# Patient Record
Sex: MALE | Race: WHITE | HISPANIC OR LATINO
[De-identification: names, ages, dates, MRNs, and addresses within clinical notes are randomized per-mention and may not be internally consistent; named-entity substitution may affect disease eponyms.]

---

## 2018-12-05 ENCOUNTER — APPOINTMENT (OUTPATIENT)
Dept: INTERNAL MEDICINE | Facility: CLINIC | Age: 83
End: 2018-12-05

## 2019-01-18 ENCOUNTER — APPOINTMENT (OUTPATIENT)
Dept: INTERNAL MEDICINE | Facility: CLINIC | Age: 84
End: 2019-01-18

## 2022-06-13 ENCOUNTER — INPATIENT (INPATIENT)
Facility: HOSPITAL | Age: 87
LOS: 3 days | Discharge: HOME | DRG: 308 | End: 2022-06-17
Attending: FAMILY MEDICINE | Admitting: INTERNAL MEDICINE
Payer: MEDICARE

## 2022-06-13 VITALS — WEIGHT: 255.07 LBS | HEIGHT: 70 IN

## 2022-06-13 DIAGNOSIS — R00.1 BRADYCARDIA, UNSPECIFIED: ICD-10-CM

## 2022-06-13 DIAGNOSIS — U07.1 COVID-19: ICD-10-CM

## 2022-06-13 LAB
ADD ON TEST-SPECIMEN IN LAB: SIGNIFICANT CHANGE UP
ALBUMIN SERPL ELPH-MCNC: 3.7 G/DL — SIGNIFICANT CHANGE UP (ref 3.3–5)
ALP SERPL-CCNC: 73 U/L — SIGNIFICANT CHANGE UP (ref 40–120)
ALT FLD-CCNC: 34 U/L — SIGNIFICANT CHANGE UP (ref 12–78)
ANION GAP SERPL CALC-SCNC: 5 MMOL/L — SIGNIFICANT CHANGE UP (ref 5–17)
APPEARANCE UR: CLEAR — SIGNIFICANT CHANGE UP
APTT BLD: 31.1 SEC — SIGNIFICANT CHANGE UP (ref 27.5–35.5)
AST SERPL-CCNC: 26 U/L — SIGNIFICANT CHANGE UP (ref 15–37)
BASOPHILS # BLD AUTO: 0.02 K/UL — SIGNIFICANT CHANGE UP (ref 0–0.2)
BASOPHILS NFR BLD AUTO: 0.5 % — SIGNIFICANT CHANGE UP (ref 0–2)
BILIRUB SERPL-MCNC: 0.7 MG/DL — SIGNIFICANT CHANGE UP (ref 0.2–1.2)
BILIRUB UR-MCNC: NEGATIVE — SIGNIFICANT CHANGE UP
BUN SERPL-MCNC: 17 MG/DL — SIGNIFICANT CHANGE UP (ref 7–23)
CALCIUM SERPL-MCNC: 8.8 MG/DL — SIGNIFICANT CHANGE UP (ref 8.5–10.1)
CHLORIDE SERPL-SCNC: 108 MMOL/L — SIGNIFICANT CHANGE UP (ref 96–108)
CO2 SERPL-SCNC: 27 MMOL/L — SIGNIFICANT CHANGE UP (ref 22–31)
COLOR SPEC: YELLOW — SIGNIFICANT CHANGE UP
CREAT SERPL-MCNC: 1.18 MG/DL — SIGNIFICANT CHANGE UP (ref 0.5–1.3)
DIFF PNL FLD: NEGATIVE — SIGNIFICANT CHANGE UP
EGFR: 60 ML/MIN/1.73M2 — SIGNIFICANT CHANGE UP
EOSINOPHIL # BLD AUTO: 0.12 K/UL — SIGNIFICANT CHANGE UP (ref 0–0.5)
EOSINOPHIL NFR BLD AUTO: 2.7 % — SIGNIFICANT CHANGE UP (ref 0–6)
GLUCOSE SERPL-MCNC: 127 MG/DL — HIGH (ref 70–99)
GLUCOSE UR QL: NEGATIVE — SIGNIFICANT CHANGE UP
HCT VFR BLD CALC: 40.7 % — SIGNIFICANT CHANGE UP (ref 39–50)
HGB BLD-MCNC: 14.2 G/DL — SIGNIFICANT CHANGE UP (ref 13–17)
IMM GRANULOCYTES NFR BLD AUTO: 0.5 % — SIGNIFICANT CHANGE UP (ref 0–1.5)
INR BLD: 1.06 RATIO — SIGNIFICANT CHANGE UP (ref 0.88–1.16)
KETONES UR-MCNC: NEGATIVE — SIGNIFICANT CHANGE UP
LEUKOCYTE ESTERASE UR-ACNC: NEGATIVE — SIGNIFICANT CHANGE UP
LYMPHOCYTES # BLD AUTO: 1.11 K/UL — SIGNIFICANT CHANGE UP (ref 1–3.3)
LYMPHOCYTES # BLD AUTO: 25.2 % — SIGNIFICANT CHANGE UP (ref 13–44)
MCHC RBC-ENTMCNC: 32.6 PG — SIGNIFICANT CHANGE UP (ref 27–34)
MCHC RBC-ENTMCNC: 34.9 GM/DL — SIGNIFICANT CHANGE UP (ref 32–36)
MCV RBC AUTO: 93.3 FL — SIGNIFICANT CHANGE UP (ref 80–100)
MONOCYTES # BLD AUTO: 0.57 K/UL — SIGNIFICANT CHANGE UP (ref 0–0.9)
MONOCYTES NFR BLD AUTO: 12.9 % — SIGNIFICANT CHANGE UP (ref 2–14)
NEUTROPHILS # BLD AUTO: 2.57 K/UL — SIGNIFICANT CHANGE UP (ref 1.8–7.4)
NEUTROPHILS NFR BLD AUTO: 58.2 % — SIGNIFICANT CHANGE UP (ref 43–77)
NITRITE UR-MCNC: NEGATIVE — SIGNIFICANT CHANGE UP
PH UR: 8 — SIGNIFICANT CHANGE UP (ref 5–8)
PLATELET # BLD AUTO: 178 K/UL — SIGNIFICANT CHANGE UP (ref 150–400)
POTASSIUM SERPL-MCNC: 4 MMOL/L — SIGNIFICANT CHANGE UP (ref 3.5–5.3)
POTASSIUM SERPL-SCNC: 4 MMOL/L — SIGNIFICANT CHANGE UP (ref 3.5–5.3)
PROT SERPL-MCNC: 7.4 GM/DL — SIGNIFICANT CHANGE UP (ref 6–8.3)
PROT UR-MCNC: NEGATIVE — SIGNIFICANT CHANGE UP
PROTHROM AB SERPL-ACNC: 12.3 SEC — SIGNIFICANT CHANGE UP (ref 10.5–13.4)
RBC # BLD: 4.36 M/UL — SIGNIFICANT CHANGE UP (ref 4.2–5.8)
RBC # FLD: 13.9 % — SIGNIFICANT CHANGE UP (ref 10.3–14.5)
SODIUM SERPL-SCNC: 140 MMOL/L — SIGNIFICANT CHANGE UP (ref 135–145)
SP GR SPEC: 1.01 — SIGNIFICANT CHANGE UP (ref 1.01–1.02)
TROPONIN I, HIGH SENSITIVITY RESULT: 10.85 NG/L — SIGNIFICANT CHANGE UP
UROBILINOGEN FLD QL: NEGATIVE — SIGNIFICANT CHANGE UP
WBC # BLD: 4.41 K/UL — SIGNIFICANT CHANGE UP (ref 3.8–10.5)
WBC # FLD AUTO: 4.41 K/UL — SIGNIFICANT CHANGE UP (ref 3.8–10.5)

## 2022-06-13 PROCEDURE — 97530 THERAPEUTIC ACTIVITIES: CPT | Mod: GP

## 2022-06-13 PROCEDURE — 99223 1ST HOSP IP/OBS HIGH 75: CPT

## 2022-06-13 PROCEDURE — 93010 ELECTROCARDIOGRAM REPORT: CPT

## 2022-06-13 PROCEDURE — 36415 COLL VENOUS BLD VENIPUNCTURE: CPT

## 2022-06-13 PROCEDURE — 71045 X-RAY EXAM CHEST 1 VIEW: CPT | Mod: 26

## 2022-06-13 PROCEDURE — 93306 TTE W/DOPPLER COMPLETE: CPT

## 2022-06-13 PROCEDURE — 86618 LYME DISEASE ANTIBODY: CPT

## 2022-06-13 PROCEDURE — 85379 FIBRIN DEGRADATION QUANT: CPT

## 2022-06-13 PROCEDURE — 97163 PT EVAL HIGH COMPLEX 45 MIN: CPT | Mod: GP

## 2022-06-13 PROCEDURE — 99285 EMERGENCY DEPT VISIT HI MDM: CPT | Mod: CS

## 2022-06-13 PROCEDURE — 84484 ASSAY OF TROPONIN QUANT: CPT

## 2022-06-13 PROCEDURE — 86140 C-REACTIVE PROTEIN: CPT

## 2022-06-13 PROCEDURE — 82728 ASSAY OF FERRITIN: CPT

## 2022-06-13 PROCEDURE — 85027 COMPLETE CBC AUTOMATED: CPT

## 2022-06-13 PROCEDURE — 97116 GAIT TRAINING THERAPY: CPT | Mod: GP

## 2022-06-13 PROCEDURE — 80048 BASIC METABOLIC PNL TOTAL CA: CPT

## 2022-06-13 PROCEDURE — 83036 HEMOGLOBIN GLYCOSYLATED A1C: CPT

## 2022-06-13 RX ORDER — ICOSAPENT ETHYL 500 MG/1
2 CAPSULE, LIQUID FILLED ORAL
Qty: 0 | Refills: 0 | DISCHARGE

## 2022-06-13 RX ORDER — HYDRALAZINE HCL 50 MG
10 TABLET ORAL EVERY 6 HOURS
Refills: 0 | Status: DISCONTINUED | OUTPATIENT
Start: 2022-06-13 | End: 2022-06-17

## 2022-06-13 RX ORDER — CHOLECALCIFEROL (VITAMIN D3) 125 MCG
2 CAPSULE ORAL
Qty: 0 | Refills: 0 | DISCHARGE

## 2022-06-13 RX ORDER — ROSUVASTATIN CALCIUM 5 MG/1
1 TABLET ORAL
Qty: 0 | Refills: 0 | DISCHARGE

## 2022-06-13 RX ORDER — LANOLIN ALCOHOL/MO/W.PET/CERES
3 CREAM (GRAM) TOPICAL AT BEDTIME
Refills: 0 | Status: DISCONTINUED | OUTPATIENT
Start: 2022-06-13 | End: 2022-06-17

## 2022-06-13 RX ORDER — SODIUM CHLORIDE 9 MG/ML
1000 INJECTION INTRAMUSCULAR; INTRAVENOUS; SUBCUTANEOUS ONCE
Refills: 0 | Status: COMPLETED | OUTPATIENT
Start: 2022-06-13 | End: 2022-06-13

## 2022-06-13 RX ORDER — PANTOPRAZOLE SODIUM 20 MG/1
1 TABLET, DELAYED RELEASE ORAL
Qty: 0 | Refills: 0 | DISCHARGE

## 2022-06-13 RX ORDER — FEBUXOSTAT 40 MG/1
1 TABLET ORAL
Qty: 0 | Refills: 0 | DISCHARGE

## 2022-06-13 RX ORDER — UBIDECARENONE 100 MG
2 CAPSULE ORAL
Qty: 0 | Refills: 0 | DISCHARGE

## 2022-06-13 RX ORDER — L.ACIDOPH/B.ANIMALIS/B.LONGUM 15B CELL
1 CAPSULE ORAL
Qty: 0 | Refills: 0 | DISCHARGE

## 2022-06-13 RX ORDER — ACETAMINOPHEN 500 MG
650 TABLET ORAL EVERY 6 HOURS
Refills: 0 | Status: DISCONTINUED | OUTPATIENT
Start: 2022-06-13 | End: 2022-06-17

## 2022-06-13 RX ORDER — AMLODIPINE BESYLATE 2.5 MG/1
5 TABLET ORAL DAILY
Refills: 0 | Status: DISCONTINUED | OUTPATIENT
Start: 2022-06-13 | End: 2022-06-14

## 2022-06-13 RX ORDER — ONDANSETRON 8 MG/1
4 TABLET, FILM COATED ORAL EVERY 8 HOURS
Refills: 0 | Status: DISCONTINUED | OUTPATIENT
Start: 2022-06-13 | End: 2022-06-17

## 2022-06-13 RX ORDER — ATORVASTATIN CALCIUM 80 MG/1
20 TABLET, FILM COATED ORAL AT BEDTIME
Refills: 0 | Status: DISCONTINUED | OUTPATIENT
Start: 2022-06-13 | End: 2022-06-17

## 2022-06-13 RX ORDER — ENOXAPARIN SODIUM 100 MG/ML
40 INJECTION SUBCUTANEOUS EVERY 24 HOURS
Refills: 0 | Status: DISCONTINUED | OUTPATIENT
Start: 2022-06-13 | End: 2022-06-17

## 2022-06-13 RX ORDER — PREGABALIN 225 MG/1
1 CAPSULE ORAL
Qty: 0 | Refills: 0 | DISCHARGE

## 2022-06-13 RX ADMIN — ATORVASTATIN CALCIUM 20 MILLIGRAM(S): 80 TABLET, FILM COATED ORAL at 22:27

## 2022-06-13 RX ADMIN — AMLODIPINE BESYLATE 5 MILLIGRAM(S): 2.5 TABLET ORAL at 17:15

## 2022-06-13 RX ADMIN — Medication 3 MILLIGRAM(S): at 22:36

## 2022-06-13 RX ADMIN — Medication 10 MILLIGRAM(S): at 23:25

## 2022-06-13 RX ADMIN — Medication 100 MILLIGRAM(S): at 23:07

## 2022-06-13 RX ADMIN — ENOXAPARIN SODIUM 40 MILLIGRAM(S): 100 INJECTION SUBCUTANEOUS at 22:36

## 2022-06-13 RX ADMIN — SODIUM CHLORIDE 1000 MILLILITER(S): 9 INJECTION INTRAMUSCULAR; INTRAVENOUS; SUBCUTANEOUS at 13:26

## 2022-06-13 RX ADMIN — Medication 10 MILLIGRAM(S): at 18:36

## 2022-06-13 NOTE — CONSULT NOTE ADULT - ASSESSMENT
ASSESSMENT & PLAN: 87 year old male with history of HTN, normal stress and echo approximately two years ago per patient report,  found to have bradycardia in setting of COVID infection, on high dose beta blocker.    Bradycardia on beta blocker, appropriate heart rate increase with movement  Patient's last dose of Coreg was this morning  Hold any further AV stella blockers  Will need to monitor patient while Coreg washes out  If patient has persistent, symptomatic bradycardia or high degree AV block off of AV  stella blockers pacemaker may be indicated  Will check echo  Patient will likely need alternative agent for blood pressure management  Further management per medicine  Plan discussed with patient, hospitalist and Dr. Vicente

## 2022-06-13 NOTE — PHARMACOTHERAPY INTERVENTION NOTE - COMMENTS
Medication history complete, reviewed medication with patients wife at [phone] and confirmed with DrFirst.  Verified Rosuvastatin and Torsemide with patient.

## 2022-06-13 NOTE — PATIENT PROFILE ADULT - FALL HARM RISK - HARM RISK INTERVENTIONS
Assistance with ambulation/Assistance OOB with selected safe patient handling equipment/Communicate Risk of Fall with Harm to all staff/Monitor gait and stability/Reinforce activity limits and safety measures with patient and family/Sit up slowly, dangle for a short time, stand at bedside before walking/Tailored Fall Risk Interventions/Visual Cue: Yellow wristband and red socks/Bed in lowest position, wheels locked, appropriate side rails in place/Call bell, personal items and telephone in reach/Instruct patient to call for assistance before getting out of bed or chair/Non-slip footwear when patient is out of bed/[place]eller to call system/Physically safe environment - no spills, clutter or unnecessary equipment/Purposeful Proactive Rounding/Room/bathroom lighting operational, light cord in reach

## 2022-06-13 NOTE — H&P ADULT - NSHPLABSRESULTS_GEN_ALL_CORE
14.2   4.41  )-----------( 178      ( 13 Jun 2022 13:11 )             40.7   06-13    140  |  108  |  17  ----------------------------<  127<H>  4.0   |  27  |  1.18    Ca    8.8      13 Jun 2022 13:11    TPro  7.4  /  Alb  3.7  /  TBili  0.7  /  DBili  x   /  AST  26  /  ALT  34  /  AlkPhos  73  06-13      12 Lead ECG (06.13.22 @ 12:44) >    Diagnosis Line Normal sinus rhythm  Right bundle branch block  Left anterior fascicular block  *** Bifascicular block ***  Minimal voltage criteria for LVH, may be normal variant  Abnormal ECG  No previous ECGs available

## 2022-06-13 NOTE — H&P ADULT - ASSESSMENT
* Dizziness weakness, sheyla in the 30s intermittent heart block  EP eval  dc Coreg  start Norvasc    * Uncontrolled HTN  start Norvasc  PRN Hydralazine    * COVID  symptoms started 1 week ago  not hypoxic  no Rx

## 2022-06-13 NOTE — ED ADULT NURSE REASSESSMENT NOTE - NS ED NURSE REASSESS COMMENT FT1
Pt resting comfortably in bed with no acute distress noted. Pt updated on their status, the current plan of care, and available results no needs or requests at this time. Call bell within reach. pt. provided portable phone to call wife.

## 2022-06-13 NOTE — ED ADULT NURSE NOTE - OBJECTIVE STATEMENT
pt. presents to ED complaining of weakness and SOB. SPO2 96%RA on mild exertion. pt. had + covid swab outp. joshua pain, discomfort, fevers.   bradycardic to low 30s on cardiac monitor.

## 2022-06-13 NOTE — H&P ADULT - HISTORY OF PRESENT ILLNESS
86 y/o male with a PMHx of HTN presents to the ED c/o cough and fatigue x1 week. Pt was seen at urgent care today, tested COVID + and came to ED for evaluation. Here his HR drops in the 30's intermittent block per ED physician, he is on Coreg at home. Adm for further evaluation of arrhythmia.

## 2022-06-13 NOTE — ED ADULT NURSE NOTE - CHIEF COMPLAINT QUOTE
c/o weakness, fatigue and inability to walk [id number]. pt was seen at  this morning +COVID. O2 93-95%, HR 60 at triage

## 2022-06-13 NOTE — ED ADULT TRIAGE NOTE - CHIEF COMPLAINT QUOTE
c/o weakness, fatigue and inability to walk [id number]. pt was seen at  this morning +COVID c/o weakness, fatigue and inability to walk [id number]. pt was seen at  this morning +COVID. O2 93-95%, HR 60 at triage

## 2022-06-13 NOTE — ED PROVIDER NOTE - CONSTITUTIONAL, MLM
normal... Comfortable appearing, awake, alert, oriented to person, place, time/situation and in no apparent distress.

## 2022-06-13 NOTE — ED PROVIDER NOTE - OBJECTIVE STATEMENT
88 y/o male with a PMHx of HTN presents to the ED c/o cough and fatigue x1 week. Pt was seen at urgent care today, tested COVID + and came to ED for evaluation.

## 2022-06-13 NOTE — H&P ADULT - NSHPPHYSICALEXAM_GEN_ALL_CORE
Vital Signs Last 24 Hrs  T(C): 36.9 (13 Jun 2022 12:46), Max: 36.9 (13 Jun 2022 12:46)  T(F): 98.5 (13 Jun 2022 12:46), Max: 98.5 (13 Jun 2022 12:46)  HR: 60 (13 Jun 2022 12:46) (60 - 60)  BP: 190/85 (13 Jun 2022 12:46) (190/85 - 190/85)  BP(mean): 115 (13 Jun 2022 12:46) (115 - 115)  RR: 17 (13 Jun 2022 12:46) (17 - 17)  SpO2: 96% (13 Jun 2022 12:46) (96% - 96%)    · CONSTITUTIONAL: Comfortable appearing, awake, alert, oriented to person, place, time/situation and in no apparent distress.  · ENMT: Airway patent, Nasal mucosa clear. Mouth with normal mucosa. Throat has no vesicles, no oropharyngeal exudates and uvula is midline.  · EYES: Clear bilaterally, pupils equal, round and reactive to light.  · CARDIAC: Bradycardic Heart sounds S1, S2.  No murmurs, rubs or gallops.  · RESPIRATORY: Breath sounds clear and equal bilaterally.  · GASTROINTESTINAL: Abdomen soft, non-tender, no guarding.  · MUSCULOSKELETAL: Spine appears normal, range of motion is not limited, no muscle or joint tenderness  · NEUROLOGICAL: Alert and oriented, no focal deficits, no motor or sensory deficits.  · SKIN: Skin normal color for race, warm, dry and intact. No evidence of rash.

## 2022-06-13 NOTE — ED PROVIDER NOTE - CARE PLAN
1 Principal Discharge DX:	Bradycardia  Secondary Diagnosis:	2019 novel coronavirus disease (COVID-19)

## 2022-06-14 LAB
CULTURE RESULTS: SIGNIFICANT CHANGE UP
D DIMER BLD IA.RAPID-MCNC: 195 NG/ML DDU — SIGNIFICANT CHANGE UP
SPECIMEN SOURCE: SIGNIFICANT CHANGE UP

## 2022-06-14 PROCEDURE — 93306 TTE W/DOPPLER COMPLETE: CPT | Mod: 26

## 2022-06-14 PROCEDURE — 99233 SBSQ HOSP IP/OBS HIGH 50: CPT

## 2022-06-14 RX ORDER — AMLODIPINE BESYLATE 2.5 MG/1
10 TABLET ORAL DAILY
Refills: 0 | Status: DISCONTINUED | OUTPATIENT
Start: 2022-06-14 | End: 2022-06-17

## 2022-06-14 RX ORDER — RITONAVIR 100 MG/1
100 TABLET, FILM COATED ORAL
Refills: 0 | Status: DISCONTINUED | OUTPATIENT
Start: 2022-06-14 | End: 2022-06-14

## 2022-06-14 RX ADMIN — AMLODIPINE BESYLATE 10 MILLIGRAM(S): 2.5 TABLET ORAL at 10:48

## 2022-06-14 RX ADMIN — Medication 10 MILLIGRAM(S): at 22:42

## 2022-06-14 RX ADMIN — Medication 3 MILLIGRAM(S): at 22:38

## 2022-06-14 RX ADMIN — ENOXAPARIN SODIUM 40 MILLIGRAM(S): 100 INJECTION SUBCUTANEOUS at 23:32

## 2022-06-14 RX ADMIN — Medication 5 MILLIGRAM(S): at 10:48

## 2022-06-14 RX ADMIN — Medication 100 MILLIGRAM(S): at 12:13

## 2022-06-14 RX ADMIN — ATORVASTATIN CALCIUM 20 MILLIGRAM(S): 80 TABLET, FILM COATED ORAL at 22:38

## 2022-06-14 RX ADMIN — Medication 100 MILLIGRAM(S): at 16:54

## 2022-06-14 RX ADMIN — Medication 100 MILLIGRAM(S): at 22:38

## 2022-06-14 RX ADMIN — Medication 10 MILLIGRAM(S): at 13:35

## 2022-06-14 NOTE — PROGRESS NOTE ADULT - SUBJECTIVE AND OBJECTIVE BOX
CC: dizzy weak (2022 12:37)    HPI: 86 y/o male with a PMHx of HTN presents to the ED c/o cough and fatigue x1 week. Pt was seen at urgent care today, tested COVID + and came to ED for evaluation. Here his HR drops in the 30's intermittent block per ED physician, he is on Coreg at home. Adm for further evaluation of arrhythmia.   (2022 16:06)    INTERVAL HPI/OVERNIGHT EVENTS: + cough, no hypoxia, low HR  Other ROS reviewed and neg     Vital Signs Last 24 Hrs  T(C): 36.1 (2022 08:04), Max: 36.9 (2022 17:15)  T(F): 97 (2022 08:04), Max: 98.5 (2022 17:15)  HR: 39 (2022 14:00) (36 - 69)  BP: 157/46 (2022 14:00) (125/65 - 200/89)  BP(mean): 75 (2022 14:00) (70 - 103)  RR: 22 (2022 14:00) (11 - 23)  SpO2: 95% (2022 14:00) (94% - 99%)  I&O's Detail    2022 07:01  -  2022 07:00  --------------------------------------------------------  IN:  Total IN: 0 mL    OUT:    Voided (mL): 950 mL  Total OUT: 950 mL    Total NET: -950 mL      2022 07:01  -  2022 15:23  --------------------------------------------------------  IN:  Total IN: 0 mL    OUT:    Voided (mL): 150 mL  Total OUT: 150 mL    Total NET: -150 mL                        14.2   4.41  )-----------( 178      ( 2022 13:11 )             40.7     2022 13:11    140    |  108    |  17     ----------------------------<  127    4.0     |  27     |  1.18     Ca    8.8        2022 13:11    TPro  7.4    /  Alb  3.7    /  TBili  0.7    /  DBili  x      /  AST  26     /  ALT  34     /  AlkPhos  73     2022 13:11    PT/INR - ( 2022 13:11 )   PT: 12.3 sec;   INR: 1.06 ratio         PTT - ( 2022 13:11 )  PTT:31.1 sec    LIVER FUNCTIONS - ( 2022 13:11 )  Alb: 3.7 g/dL / Pro: 7.4 gm/dL / ALK PHOS: 73 U/L / ALT: 34 U/L / AST: 26 U/L / GGT: x           Urinalysis Basic - ( 2022 13:11 )    Color: Yellow / Appearance: Clear / S.010 / pH: x  Gluc: x / Ketone: Negative  / Bili: Negative / Urobili: Negative   Blood: x / Protein: Negative / Nitrite: Negative   Leuk Esterase: Negative / RBC: x / WBC x   Sq Epi: x / Non Sq Epi: x / Bacteria: x    MEDICATIONS  (STANDING):  amLODIPine   Tablet 10 milliGRAM(s) Oral daily  atorvastatin 20 milliGRAM(s) Oral at bedtime  benzonatate 100 milliGRAM(s) Oral every 8 hours  enoxaparin Injectable 40 milliGRAM(s) SubCutaneous every 24 hours  torsemide 5 milliGRAM(s) Oral daily    MEDICATIONS  (PRN):  acetaminophen     Tablet .. 650 milliGRAM(s) Oral every 6 hours PRN Temp greater or equal to 38C (100.4F), Mild Pain (1 - 3)  aluminum hydroxide/magnesium hydroxide/simethicone Suspension 30 milliLiter(s) Oral every 4 hours PRN Dyspepsia  guaiFENesin Oral Liquid (Sugar-Free) 100 milliGRAM(s) Oral every 6 hours PRN Cough  hydrALAZINE Injectable 10 milliGRAM(s) IV Push every 6 hours PRN BP>160  melatonin 3 milliGRAM(s) Oral at bedtime PRN Insomnia  ondansetron Injectable 4 milliGRAM(s) IV Push every 8 hours PRN Nausea and/or Vomiting    RADIOLOGY & ADDITIONAL TESTS: personally visualized    PHYSICAL EXAM:    General: obese male in no acute distress  Eyes: PERRLA, EOMI; conjunctiva and sclera clear  Head: Normocephalic; atraumatic  ENMT: No nasal discharge; airway clear  Neck: Supple; non tender; no masses  Respiratory: No wheezes, rales or rhonchi  Cardiovascular: S1, S2 reg, bradycardic  Gastrointestinal: Soft non-tender non-distended; Normal bowel sounds  Genitourinary: No costovertebral angle tenderness  Extremities: No clubbing, cyanosis or edema  Vascular: Peripheral pulses palpable 2+ bilaterally  Neurological: Alert and oriented x4  Skin: Warm and dry.   Musculoskeletal: Normal tone  Psychiatric: Cooperative and appropriate

## 2022-06-14 NOTE — PROGRESS NOTE ADULT - SUBJECTIVE AND OBJECTIVE BOX
HPI:  86 y/o male with a PMHx of HTN presents to the ED c/o cough and fatigue x1 week. Pt was seen at urgent care today, tested COVID + and came to ED for evaluation. In ED patient noted to have episodes bradycardia with HR 30s.  Patient denies any CP, palpitations, SOB, dizziness, lightheadedness, near syncope or syncope.  Patient is currently on Coreg 37.5mg BID and took last dose this morning.  EP asked to evaluate for bradycardia.       (2022 16:06)      Subjective:      TELE: sinus with 2:1 HB, bradycardic, hear rate ranges 36-42.      MEDICATIONS  (STANDING):  amLODIPine   Tablet 10 milliGRAM(s) Oral daily  atorvastatin 20 milliGRAM(s) Oral at bedtime  benzonatate 100 milliGRAM(s) Oral every 8 hours  enoxaparin Injectable 40 milliGRAM(s) SubCutaneous every 24 hours  torsemide 5 milliGRAM(s) Oral daily    MEDICATIONS  (PRN):  acetaminophen     Tablet .. 650 milliGRAM(s) Oral every 6 hours PRN Temp greater or equal to 38C (100.4F), Mild Pain (1 - 3)  aluminum hydroxide/magnesium hydroxide/simethicone Suspension 30 milliLiter(s) Oral every 4 hours PRN Dyspepsia  guaiFENesin Oral Liquid (Sugar-Free) 100 milliGRAM(s) Oral every 6 hours PRN Cough  hydrALAZINE Injectable 10 milliGRAM(s) IV Push every 6 hours PRN BP>160  melatonin 3 milliGRAM(s) Oral at bedtime PRN Insomnia  ondansetron Injectable 4 milliGRAM(s) IV Push every 8 hours PRN Nausea and/or Vomiting      Allergies    No Known Allergies      Vital Signs Last 24 Hrs  T(C): 36.1 (2022 08:04), Max: 36.9 (2022 12:46)  T(F): 97 (2022 08:04), Max: 98.5 (2022 12:46)  HR: 40 (2022 09:00) (36 - 69)  BP: 156/82 (2022 08:00) (125/65 - 200/89)  BP(mean): 99 (2022 08:00) (70 - 115)  RR: 15 (2022 09:00) (12 - 23)  SpO2: 96% (2022 09:00) (94% - 99%)    PHYSICAL EXAMINATION:  GENERAL: In no apparent distress, well nourished, and hydrated.  HEAD:  Atraumatic, Normocephalic  EYES: EOMI, PERRLA, conjunctiva and sclera clear  ENMT: No tonsillar erythema, exudates, or enlargement; Moist mucous membranes, Good dentition, No lesions  NECK: Supple and normal thyroid.  No JVD or carotid bruit.  Carotid pulse is 2+ bilaterally.  HEART: bradycardic, Regular rhythm; No murmurs, rubs, or gallops.  PULMONARY: Clear to auscultation and perfusion.  No rales, wheezing, or rhonchi bilaterally.  ABDOMEN: Soft, Nontender, Nondistended; Bowel sounds present  EXTREMITIES:  2+ Peripheral Pulses, No clubbing, cyanosis, or edema  LYMPH: No lymphadenopathy noted  NEUROLOGICAL: Grossly nonfocal    LABS:                        14.2   4.41  )-----------( 178      ( 2022 13:11 )             40.7     06-13    140  |  108  |  17  ----------------------------<  127<H>  4.0   |  27  |  1.18    Ca    8.8      2022 13:11    TPro  7.4  /  Alb  3.7  /  TBili  0.7  /  DBili  x   /  AST  26  /  ALT  34  /  AlkPhos  73  06-13    PT/INR - ( 2022 13:11 )   PT: 12.3 sec;   INR: 1.06 ratio         PTT - ( 2022 13:11 )  PTT:31.1 sec  Urinalysis Basic - ( 2022 13:11 )    Color: Yellow / Appearance: Clear / S.010 / pH: x  Gluc: x / Ketone: Negative  / Bili: Negative / Urobili: Negative   Blood: x / Protein: Negative / Nitrite: Negative   Leuk Esterase: Negative / RBC: x / WBC x   Sq Epi: x / Non Sq Epi: x / Bacteria: x            RADIOLOGY & ADDITIONAL TESTS:    ECHO 22 - report pending

## 2022-06-14 NOTE — PROGRESS NOTE ADULT - ASSESSMENT
1. 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 CoVID infection - stable, >5 days  - symtpoamtic management    3. VTE proph - LMWH    4. HTN - amlodipine, hydralazine    5. HLD - statin    Time spent 63 min  Discussed with RN, EP team

## 2022-06-14 NOTE — PROGRESS NOTE ADULT - ASSESSMENT
87 year old male with history of HTN, normal stress and echo approximately two years ago per patient report,  found to have bradycardia in setting of COVID infection, on high dose beta blocker.    Bradycardia, 2:1 Heart block  hemodynamically stable, asymptomatic   continue tele  await coreg washout for another 24hrs  Hold any further AV stella blockers  If patient has persistent, symptomatic bradycardia or high degree AV block off of AV  stella blockers pacemaker may be indicated  EP will follow

## 2022-06-15 LAB
A1C WITH ESTIMATED AVERAGE GLUCOSE RESULT: 5.6 % — SIGNIFICANT CHANGE UP (ref 4–5.6)
ANION GAP SERPL CALC-SCNC: 7 MMOL/L — SIGNIFICANT CHANGE UP (ref 5–17)
BUN SERPL-MCNC: 15 MG/DL — SIGNIFICANT CHANGE UP (ref 7–23)
CALCIUM SERPL-MCNC: 8.9 MG/DL — SIGNIFICANT CHANGE UP (ref 8.5–10.1)
CHLORIDE SERPL-SCNC: 110 MMOL/L — HIGH (ref 96–108)
CO2 SERPL-SCNC: 24 MMOL/L — SIGNIFICANT CHANGE UP (ref 22–31)
CREAT SERPL-MCNC: 1.02 MG/DL — SIGNIFICANT CHANGE UP (ref 0.5–1.3)
EGFR: 72 ML/MIN/1.73M2 — SIGNIFICANT CHANGE UP
ESTIMATED AVERAGE GLUCOSE: 114 MG/DL — SIGNIFICANT CHANGE UP (ref 68–114)
GLUCOSE SERPL-MCNC: 118 MG/DL — HIGH (ref 70–99)
HCT VFR BLD CALC: 41.3 % — SIGNIFICANT CHANGE UP (ref 39–50)
HGB BLD-MCNC: 14.7 G/DL — SIGNIFICANT CHANGE UP (ref 13–17)
MCHC RBC-ENTMCNC: 32.7 PG — SIGNIFICANT CHANGE UP (ref 27–34)
MCHC RBC-ENTMCNC: 35.6 GM/DL — SIGNIFICANT CHANGE UP (ref 32–36)
MCV RBC AUTO: 91.8 FL — SIGNIFICANT CHANGE UP (ref 80–100)
PLATELET # BLD AUTO: 204 K/UL — SIGNIFICANT CHANGE UP (ref 150–400)
POTASSIUM SERPL-MCNC: 3.6 MMOL/L — SIGNIFICANT CHANGE UP (ref 3.5–5.3)
POTASSIUM SERPL-SCNC: 3.6 MMOL/L — SIGNIFICANT CHANGE UP (ref 3.5–5.3)
RBC # BLD: 4.5 M/UL — SIGNIFICANT CHANGE UP (ref 4.2–5.8)
RBC # FLD: 14.1 % — SIGNIFICANT CHANGE UP (ref 10.3–14.5)
SODIUM SERPL-SCNC: 141 MMOL/L — SIGNIFICANT CHANGE UP (ref 135–145)
WBC # BLD: 7.73 K/UL — SIGNIFICANT CHANGE UP (ref 3.8–10.5)
WBC # FLD AUTO: 7.73 K/UL — SIGNIFICANT CHANGE UP (ref 3.8–10.5)

## 2022-06-15 PROCEDURE — 99232 SBSQ HOSP IP/OBS MODERATE 35: CPT

## 2022-06-15 RX ADMIN — Medication 100 MILLIGRAM(S): at 14:41

## 2022-06-15 RX ADMIN — ENOXAPARIN SODIUM 40 MILLIGRAM(S): 100 INJECTION SUBCUTANEOUS at 21:34

## 2022-06-15 RX ADMIN — Medication 100 MILLIGRAM(S): at 21:32

## 2022-06-15 RX ADMIN — Medication 100 MILLIGRAM(S): at 05:06

## 2022-06-15 RX ADMIN — Medication 5 MILLIGRAM(S): at 10:49

## 2022-06-15 RX ADMIN — ATORVASTATIN CALCIUM 20 MILLIGRAM(S): 80 TABLET, FILM COATED ORAL at 21:33

## 2022-06-15 RX ADMIN — Medication 10 MILLIGRAM(S): at 06:26

## 2022-06-15 RX ADMIN — AMLODIPINE BESYLATE 10 MILLIGRAM(S): 2.5 TABLET ORAL at 10:49

## 2022-06-15 NOTE — PROGRESS NOTE ADULT - SUBJECTIVE AND OBJECTIVE BOX
HPI:  88 y/o male with a PMHx of HTN presents to the ED c/o cough and fatigue x1 week. Pt was seen at urgent care today, tested COVID + and came to ED for evaluation. In ED patient noted to have episodes bradycardia with HR 30s.  Patient denies any CP, palpitations, SOB, dizziness, lightheadedness, near syncope or syncope.  Patient is currently on Coreg 37.5mg BID and took last dose this morning.  EP asked to evaluate for bradycardia.    6/15/22:  pt seen sitting up in bed in NAD, VSS, wearing O2 via NC, sat 97%.  Last dose Coreg on Monday AM.  Pt endorses that he may have been feeling dizzy and weak with with LOVELL for over a month, prior to getting covid.  TELE: sinus with intermittent yet more frequent  2:1 HB, bradycardic  36-42 bpm      MEDICATIONS  (STANDING):  amLODIPine   Tablet 10 milliGRAM(s) Oral daily  atorvastatin 20 milliGRAM(s) Oral at bedtime  benzonatate 100 milliGRAM(s) Oral every 8 hours  enoxaparin Injectable 40 milliGRAM(s) SubCutaneous every 24 hours  torsemide 5 milliGRAM(s) Oral daily    MEDICATIONS  (PRN):  acetaminophen     Tablet .. 650 milliGRAM(s) Oral every 6 hours PRN Temp greater or equal to 38C (100.4F), Mild Pain (1 - 3)  aluminum hydroxide/magnesium hydroxide/simethicone Suspension 30 milliLiter(s) Oral every 4 hours PRN Dyspepsia  guaiFENesin Oral Liquid (Sugar-Free) 100 milliGRAM(s) Oral every 6 hours PRN Cough  hydrALAZINE Injectable 10 milliGRAM(s) IV Push every 6 hours PRN BP>160  melatonin 3 milliGRAM(s) Oral at bedtime PRN Insomnia  ondansetron Injectable 4 milliGRAM(s) IV Push every 8 hours PRN Nausea and/or Vomiting    Allergies    No Known Allergies    Intolerances      REVIEW OF SYSTEMS:    CONSTITUTIONAL: As per HPI  EYES/ENT: No visual changes;  No vertigo or throat pain   NECK: No pain or stiffness  RESPIRATORY: +cough, No shortness of breath  CARDIOVASCULAR: No chest pain or palpitations  GASTROINTESTINAL: No abdominal or epigastric pain. No nausea, vomiting, or hematemesis; No diarrhea or constipation. No melena or hematochezia.  GENITOURINARY: No dysuria, frequency or hematuria  NEUROLOGICAL: Occasional lightheadedness,  weakness  SKIN: No itching, burning, rashes, or lesions   All other review of systems is negative unless indicated above      Vital Signs Last 24 Hrs  T(C): 36 (15 Volodymyr 2022 11:40), Max: 36.9 (14 Jun 2022 20:00)  T(F): 96.8 (15 Volodymyr 2022 11:40), Max: 98.4 (14 Jun 2022 20:00)  HR: 44 (15 Volodymyr 2022 11:00) (36 - 71)  BP: 163/58 (15 Volodymyr 2022 11:00) (115/57 - 183/62)  BP(mean): 85 (15 Volodymyr 2022 11:00) (69 - 95)  RR: 17 (15 Volodymyr 2022 11:00) (11 - 23)  SpO2: 100% (15 Volodymyr 2022 11:00) (95% - 100%)                          14.7   7.73  )-----------( 204      ( 15 Volodymyr 2022 05:07 )             41.3     06-15    141  |  110<H>  |  15  ----------------------------<  118<H>  3.6   |  24  |  1.02    Ca    8.9      15 Volod[place] 2022 05:07    TPro  7.4  /  Alb  3.7  /  TBili  0.7  /  DBili  x   /  AST  26  /  ALT  34  /  AlkPhos  73  06-13    TroponinI hsT: <-10.85, <-11.39      PHYSICAL EXAM:    General: WN/WD NAD  Neurology: A&Ox3, nonfocal, GRAF x 4  HEENT: NC/AT, neck supple, no JVD, EOMI, PERRL  Respiratory: CTA B/L  CV: S1S2, no murmurs, rubs or gallops  Abdominal: Soft, NT, ND +BS  Extremities: No edema, + peripheral pulses  Skin: No rashes    < from: TTE Echo Complete w/o Contrast w/ Doppler (06.14.22 @ 11:56) >   Impression     Summary     Left ventricle systolic function appears preserved in the presence of a   cardiac arrhythmia. Left ventricle size and structure are within normal   limitations.   Estimated left ventricular ejection fraction is 60 %.   Normal appearing left atrium.   Normal appearing right atrium.   Normal appearing right ventricle structure and function.   The aortic valve appears mildly calcified. Valve opening seems to be   normal.   Moderate mitral annular calcification is present.   The mitral valve leaflets appear thickened.   EA reversal of the mitral inflow consistent with reducedcompliance of   the   left ventricle.   Trace mitral regurgitation is present.   The tricuspid valve leaflets appear mildly thickened and/or calcified,   but   open well.   Trace to mild tricuspid valve regurgitation is present.   No evidence of pericardial effusion.         HPI:  86 y/o male with a PMHx of HTN presents to the ED c/o cough and fatigue x1 week. Pt was seen at urgent care today, tested COVID + and came to ED for evaluation. In ED patient noted to have episodes bradycardia with HR 30s.  Patient denies any CP, palpitations, SOB, dizziness, lightheadedness, near syncope or syncope.  Patient is currently on Coreg 37.5mg BID and took last dose this morning.  EP asked to evaluate for bradycardia.    6/15/22:  pt seen sitting up in bed in NAD, VSS, wearing O2 via NC, sat 97%.  Last dose Coreg on Monday AM.  Pt endorses that he may have been feeling dizzy and weak with with LOVELL for over a month, prior to getting covid.  TELE: sinus with intermittent yet more frequent  2:1 HB, bradycardic  36-42 bpm  EKG:  < from: 12 Lead ECG (06.13.22 @ 12:44) >  Ventricular Rate 62 BPM  Atrial Rate 62 BPM  P-R Interval 178 ms  QRS Duration 146 ms  Q-T Interval 482 ms  QTC Calculation(Bazett) 489 ms  P Axis 24 degrees  R Axis -62 degrees  T Axis -27 degrees    Diagnosis Line Normal sinus rhythm  Right bundle branch block  Left anterior fascicular block  *** Bifascicular block ***  Minimal voltage criteria for LVH, may be normal variant  Abnormal ECG  No previous ECGs available  Confirmed by MATTHIAS KHAN MD ([id number]) on 6/13/2022 3:31:43 PM             MEDICATIONS  (STANDING):  amLODIPine   Tablet 10 milliGRAM(s) Oral daily  atorvastatin 20 milliGRAM(s) Oral at bedtime  benzonatate 100 milliGRAM(s) Oral every 8 hours  enoxaparin Injectable 40 milliGRAM(s) SubCutaneous every 24 hours  torsemide 5 milliGRAM(s) Oral daily    MEDICATIONS  (PRN):  acetaminophen     Tablet .. 650 milliGRAM(s) Oral every 6 hours PRN Temp greater or equal to 38C (100.4F), Mild Pain (1 - 3)  aluminum hydroxide/magnesium hydroxide/simethicone Suspension 30 milliLiter(s) Oral every 4 hours PRN Dyspepsia  guaiFENesin Oral Liquid (Sugar-Free) 100 milliGRAM(s) Oral every 6 hours PRN Cough  hydrALAZINE Injectable 10 milliGRAM(s) IV Push every 6 hours PRN BP>160  melatonin 3 milliGRAM(s) Oral at bedtime PRN Insomnia  ondansetron Injectable 4 milliGRAM(s) IV Push every 8 hours PRN Nausea and/or Vomiting    Allergies    No Known Allergies    Intolerances      REVIEW OF SYSTEMS:    CONSTITUTIONAL: As per HPI  EYES/ENT: No visual changes;  No vertigo or throat pain   NECK: No pain or stiffness  RESPIRATORY: +cough, No shortness of breath  CARDIOVASCULAR: No chest pain or palpitations  GASTROINTESTINAL: No abdominal or epigastric pain. No nausea, vomiting, or hematemesis; No diarrhea or constipation. No melena or hematochezia.  GENITOURINARY: No dysuria, frequency or hematuria  NEUROLOGICAL: Occasional lightheadedness,  weakness  SKIN: No itching, burning, rashes, or lesions   All other review of systems is negative unless indicated above      Vital Signs Last 24 Hrs  T(C): 36 (15 Volodymyr 2022 11:40), Max: 36.9 (14 Jun 2022 20:00)  T(F): 96.8 (15 Volodymyr 2022 11:40), Max: 98.4 (14 Jun 2022 20:00)  HR: 44 (15 Volodymyr 2022 11:00) (36 - 71)  BP: 163/58 (15 Volodymyr 2022 11:00) (115/57 - 183/62)  BP(mean): 85 (15 Volodymyr 2022 11:00) (69 - 95)  RR: 17 (15 Volodymyr 2022 11:00) (11 - 23)  SpO2: 100% (15 Volodymyr 2022 11:00) (95% - 100%)                          14.7   7.73  )-----------( 204      ( 15 Volodymyr 2022 05:07 )             41.3     06-15    141  |  110<H>  |  15  ----------------------------<  118<H>  3.6   |  24  |  1.02    Ca    8.9      15 Vol[place] 2022 05:07    TPro  7.4  /  Alb  3.7  /  TBili  0.7  /  DBili  x   /  AST  26  /  ALT  34  /  AlkPhos  73  06-13    TroponinI hsT: <-10.85, <-11.39      PHYSICAL EXAM:    General: WN/WD NAD  Neurology: A&Ox3, nonfocal, GRAF x 4  HEENT: NC/AT, neck supple, no JVD, EOMI, PERRL  Respiratory: CTA B/L  CV: S1S2, no murmurs, rubs or gallops  Abdominal: Soft, NT, ND +BS  Extremities: No edema, + peripheral pulses  Skin: No rashes    < from: TTE Echo Complete w/o Contrast w/ Doppler (06.14.22 @ 11:56) >   Impression     Summary     Left ventricle systolic function appears preserved in the presence of a   cardiac arrhythmia. Left ventricle size and structure are within normal   limitations.   Estimated left ventricular ejection fraction is 60 %.   Normal appearing left atrium.   Normal appearing right atrium.   Normal appearing right ventricle structure and function.   The aortic valve appears mildly calcified. Valve opening seems to be   normal.   Moderate mitral annular calcification is present.   The mitral valve leaflets appear thickened.   EA reversal of the mitral inflow consistent with reducedcompliance of   the   left ventricle.   Trace mitral regurgitation is present.   The tricuspid valve leaflets appear mildly thickened and/or calcified,   but   open well.   Trace to mild tricuspid valve regurgitation is present.   No evidence of pericardial effusion.

## 2022-06-15 NOTE — PROGRESS NOTE ADULT - SUBJECTIVE AND OBJECTIVE BOX
CC: dizzy weak (2022 12:37)    HPI: 88 y/o male with a PMHx of HTN presents to the ED c/o cough and fatigue x1 week. Pt was seen at urgent care today, tested COVID + and came to ED for evaluation. Here his HR drops in the 30's intermittent block per ED physician, he is on Coreg at home. Adm for further evaluation of arrhythmia.   (2022 16:06)    INTERVAL HPI/OVERNIGHT EVENTS: + cough, no hypoxia, low HR, forgetful  Other ROS reviewed and neg     T(C): 35.8 (06-15-22 @ 06:06), Max: 36.9 (22 @ 20:00)  HR: 42 (06-15-22 @ 06:00) (36 - 71)  BP: 180/56 (06-15-22 @ 06:00) (115/57 - 183/62)  RR: 14 (06-15-22 @ 06:00) (11 - 23)  SpO2: 98% (06-15-22 @ 06:00) (95% - 98%)    22 @ 07:01  -  06-15-22 @ 07:00  --------------------------------------------------------  IN: 1100 mL / OUT: 2000 mL / NET: -900 mL      Daily     Daily Weight in k.6 (15 Volodymyr 2022 06:06)                       14.7   7.73  )-----------( 204      ( 15 Volodymyr 2022 05:07 )             41.3     15 Volodymyr 2022 05:07    141    |  110    |  15     ----------------------------<  118    3.6     |  24     |  1.02     Ca    8.9        15 Volodymyr 2022 05:07    TPro  7.4    /  Alb  3.7    /  TBili  0.7    /  DBili  x      /  AST  26     /  ALT  34     /  AlkPhos  73     2022 13:11    PT/INR - ( 2022 13:11 )   PT: 12.3 sec;   INR: 1.06 ratio       PTT - ( 2022 13:11 )  PTT:31.1 sec    LIVER FUNCTIONS - ( 2022 13:11 )  Alb: 3.7 g/dL / Pro: 7.4 gm/dL / ALK PHOS: 73 U/L / ALT: 34 U/L / AST: 26 U/L / GGT: x           Urinalysis Basic - ( 2022 13:11 )    Color: Yellow / Appearance: Clear / S.010 / pH: x  Gluc: x / Ketone: Negative  / Bili: Negative / Urobili: Negative   Blood: x / Protein: Negative / Nitrite: Negative   Leuk Esterase: Negative / RBC: x / WBC x   Sq Epi: x / Non Sq Epi: x / Bacteria: x    acetaminophen     Tablet .. 650 milliGRAM(s) Oral every 6 hours PRN  aluminum hydroxide/magnesium hydroxide/simethicone Suspension 30 milliLiter(s) Oral every 4 hours PRN  amLODIPine   Tablet 10 milliGRAM(s) Oral daily  atorvastatin 20 milliGRAM(s) Oral at bedtime  benzonatate 100 milliGRAM(s) Oral every 8 hours  enoxaparin Injectable 40 milliGRAM(s) SubCutaneous every 24 hours  guaiFENesin Oral Liquid (Sugar-Free) 100 milliGRAM(s) Oral every 6 hours PRN  hydrALAZINE Injectable 10 milliGRAM(s) IV Push every 6 hours PRN  melatonin 3 milliGRAM(s) Oral at bedtime PRN  ondansetron Injectable 4 milliGRAM(s) IV Push every 8 hours PRN  torsemide 5 milliGRAM(s) Oral daily    RADIOLOGY & ADDITIONAL TESTS: personally visualized    PHYSICAL EXAM:    General: obese male in no acute distress  Eyes: PERRLA, EOMI; conjunctiva and sclera clear  Head: Normocephalic; atraumatic  ENMT: No nasal discharge; airway clear  Neck: Supple; non tender; no masses  Respiratory: No wheezes, rales or rhonchi  Cardiovascular: S1, S2 reg, bradycardic  Gastrointestinal: Soft non-tender non-distended; Normal bowel sounds  Genitourinary: No costovertebral angle tenderness  Extremities: No clubbing, cyanosis or edema  Vascular: Peripheral pulses palpable 2+ bilaterally  Neurological: Alert and oriented x4  Skin: Warm and dry.   Musculoskeletal: Normal tone  Psychiatric: Cooperative, forgetful

## 2022-06-15 NOTE — PROGRESS NOTE ADULT - ASSESSMENT
87 year old male with history of HTN, normal stress and echo approximately two years ago per patient report,  found to have bradycardia in setting of COVID infection, on high dose beta blocker.    A/P:  Bradycardia, intermittent 2:1 Heart block becoming more frequent  Continue tele montiroring  Hemodynamically stable, asymptomatic at present- pt states he has had LOVELL for at least a month when trying to do things around the house  Last dose coreg was 6/13 in am  Hold any further AV stella blockers  If patient has persistent high degree AV block off of AV stella blockers, pacemaker may be indicated  He is in agreement for PPM if it is warranted.  2D echo reveals normal LV function, EF 60%  Pt is Covid +, continued care per medicine  Will discuss plan with Dr. Vicente and hospitalist MD.

## 2022-06-15 NOTE — CONSULT NOTE ADULT - SUBJECTIVE AND OBJECTIVE BOX
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 Pt was seen at urgent care tested COVID + and came to ED for evaluation. Here his HR drops in the 30's intermittent block per ED physician, Pt. never syncopsized or loss consciousness he is on Coreg 37.5 mg BID at home. Admitted for further evaluation of arrhythmia.    Subjective/Observations: Pt. seen and examined and evaluated. Pt. resting comfortably in bed in NAD, with no respiratory distress, no chest pain, dyspnea, palpitations, PND, or orthopnea.    REVIEW OF SYSTEMS: All other review of systems is negative unless indicated above    PAST MEDICAL & SURGICAL HISTORY:  HTN (hypertension)      MEDICATIONS  (STANDING):  amLODIPine   Tablet 10 milliGRAM(s) Oral daily  atorvastatin 20 milliGRAM(s) Oral at bedtime  benzonatate 100 milliGRAM(s) Oral every 8 hours  enoxaparin Injectable 40 milliGRAM(s) SubCutaneous every 24 hours  torsemide 5 milliGRAM(s) Oral daily    MEDICATIONS  (PRN):  acetaminophen     Tablet .. 650 milliGRAM(s) Oral every 6 hours PRN Temp greater or equal to 38C (100.4F), Mild Pain (1 - 3)  aluminum hydroxide/magnesium hydroxide/simethicone Suspension 30 milliLiter(s) Oral every 4 hours PRN Dyspepsia  guaiFENesin Oral Liquid (Sugar-Free) 100 milliGRAM(s) Oral every 6 hours PRN Cough  hydrALAZINE Injectable 10 milliGRAM(s) IV Push every 6 hours PRN BP>160  melatonin 3 milliGRAM(s) Oral at bedtime PRN Insomnia  ondansetron Injectable 4 milliGRAM(s) IV Push every 8 hours PRN Nausea and/or Vomiting      Allergies: No Known Allergies    Vital Signs Last 24 Hrs  T(C): 36.7 (15 Volodymyr 2022 14:26), Max: 36.9 (14 Jun 2022 20:00)  T(F): 98.1 (15 Volodymyr 2022 14:26), Max: 98.4 (14 Jun 2022 20:00)  HR: 44 (15 Volodymyr 2022 11:00) (36 - 71)  BP: 163/58 (15 Volodymyr 2022 11:00) (115/57 - 180/56)  BP(mean): 85 (15 Volodymyr 2022 11:00) (69 - 95)  RR: 17 (15 Volodymyr 2022 11:00) (12 - 23)  SpO2: 100% (15 Volodymyr 2022 11:00) (95% - 100%)    I&O's Summary    14 Jun 2022 07:01  -  15 Volodymyr 2022 07:00  --------------------------------------------------------  IN: 1100 mL / OUT: 2000 mL / NET: -900 mL    15 Volodymyr 2022 07:01  -  15 Volodymyr 2022 17:06  --------------------------------------------------------  IN: 0 mL / OUT: 450 mL / NET: -450 mL         LABS: All Labs Reviewed:                        14.7   7.73  )-----------( 204      ( 15 Volodymyr 2022 05:07 )             41.3            15 Volodymyr 2022 05:07    141    |  110    |  15     ----------------------------<  118    3.6     |  24     |  1.02     Ca    8.9        15 Volodymyr 2022 05:07  Ca    8.8        13 Jun 2022 13:11    TPro  7.4    /  Alb  3.7    /  TBili  0.7    /  DBili  x      /  AST  26     /  ALT  34     /  AlkPhos  73     13 Jun 2022 13:11    Echo:  < from: TTE Echo Complete w/o Contrast w/ Doppler (06.14.22 @ 11:56) >  Impression   Summary   Left ventricle systolic function appears preserved in the presence of a   cardiac arrhythmia. Left ventricle size and structure are within normal   limitations.   Estimated left ventricular ejection fraction is 60 %.   Normal appearing left atrium.   Normal appearing right atrium.   Normal appearing right ventricle structure and function.   The aortic valve appears mildly calcified. Valve opening seems to be   normal.   Moderate mitral annular calcification is present.   The mitral valve leaflets appear thickened.   EA reversal of the mitral inflow consistent with reduced compliance of   the   left ventricle.   Trace mitral regurgitation is present.   The tricuspid valve leaflets appear mildly thickened and/or calcified,   but   open well.   Trace to mild tricuspid valve regurgitation is present.   No evidence of pericardial effusion     EKG:     Interpretation of Telemetry:    
HPI:  88 y/o male with a PMHx of HTN presents to the ED c/o cough and fatigue x1 week. Pt was seen at urgent care today, tested COVID + and came to ED for evaluation. In ED patient noted to have episodes bradycardia with HR 30s.  Patient denies any CP, palpitations, SOB, dizziness, lightheadedness, near syncope or syncope.  Patient is currently on Coreg 37.5mg BID and took last dose this morning.  EP asked to evaluate for bradycardia.    PAST MEDICAL & SURGICAL HISTORY:  HTN (hypertension)          MEDICATIONS  (STANDING):  amLODIPine   Tablet 5 milliGRAM(s) Oral daily  atorvastatin 20 milliGRAM(s) Oral at bedtime  enoxaparin Injectable 40 milliGRAM(s) SubCutaneous every 24 hours  torsemide 5 milliGRAM(s) Oral daily    MEDICATIONS  (PRN):  acetaminophen     Tablet .. 650 milliGRAM(s) Oral every 6 hours PRN Temp greater or equal to 38C (100.4F), Mild Pain (1 - 3)  aluminum hydroxide/magnesium hydroxide/simethicone Suspension 30 milliLiter(s) Oral every 4 hours PRN Dyspepsia  hydrALAZINE Injectable 10 milliGRAM(s) IV Push every 6 hours PRN BP>160  melatonin 3 milliGRAM(s) Oral at bedtime PRN Insomnia  ondansetron Injectable 4 milliGRAM(s) IV Push every 8 hours PRN Nausea and/or Vomiting      Allergies    No Known Allergies      SOCIAL HISTORY: Denies tobacco, etoh abuse or illicit drug use    FAMILY HISTORY:      Vital Signs Last 24 Hrs  T(C): 36.9 (2022 12:46), Max: 36.9 (2022 12:46)  T(F): 98.5 (2022 12:46), Max: 98.5 (2022 12:46)  HR: 60 (2022 12:46) (60 - 60)  BP: 190/85 (2022 12:46) (190/85 - 190/85)  BP(mean): 115 (2022 12:46) (115 - 115)  RR: 17 (2022 12:46) (17 - 17)  SpO2: 96% (2022 12:46) (96% - 96%)    REVIEW OF SYSTEMS:    CONSTITUTIONAL:  As per HPI.  HEENT:  Eyes:  No diplopia or blurred vision. ENT:  No earache, sore throat or runny nose.  CARDIOVASCULAR:  No pressure, squeezing, strangling, tightness, heaviness or aching about the chest, neck, axilla or epigastrium.  RESPIRATORY: +cough, denies shortness of breath, PND or orthopnea.  GASTROINTESTINAL:  No nausea, vomiting or diarrhea.  GENITOURINARY:  No dysuria, frequency or urgency.  MUSCULOSKELETAL:  As per HPI.  SKIN:  No change in skin, hair or nails.  NEUROLOGIC:  No paresthesias, fasciculations, seizures or weakness.  PSYCHIATRIC:  No disorder of thought or mood.  ENDOCRINE:  No heat or cold intolerance, polyuria or polydipsia.  HEMATOLOGICAL:  No easy bruising or bleedings:  .     PHYSICAL EXAMINATION:    GENERAL APPEARANCE:  Pt. is not currently dyspneic, in no distress. Pt. is alert, oriented, and pleasant.  HEENT:  Pupils are normal and react normally. No icterus. Mucous membranes well colored.  NECK:  Supple. No lymphadenopathy. Jugular venous pressure not elevated. Carotids equal.   HEART:   The cardiac impulse has a normal quality. There are no murmurs, rubs or gallops noted  CHEST:  Chest is clear to auscultation. Normal respiratory effort.  ABDOMEN:  Soft and nontender.   EXTREMITIES:  There is no edema.   SKIN:  No rash or significant lesions are noted.    I&O's Summary      LABS:                        14.2   4.41  )-----------( 178      ( 2022 13:11 )             40.7     06-13    140  |  108  |  17  ----------------------------<  127<H>  4.0   |  27  |  1.18    Ca    8.8      2022 13:11    TPro  7.4  /  Alb  3.7  /  TBili  0.7  /  DBili  x   /  AST  26  /  ALT  34  /  AlkPhos  73  06-13    LIVER FUNCTIONS - ( 2022 13:11 )  Alb: 3.7 g/dL / Pro: 7.4 gm/dL / ALK PHOS: 73 U/L / ALT: 34 U/L / AST: 26 U/L / GGT: x           PT/INR - ( 2022 13:11 )   PT: 12.3 sec;   INR: 1.06 ratio         PTT - ( 2022 13:11 )  PTT:31.1 sec      Urinalysis Basic - ( 2022 13:11 )    Color: Yellow / Appearance: Clear / S.010 / pH: x  Gluc: x / Ketone: Negative  / Bili: Negative / Urobili: Negative   Blood: x / Protein: Negative / Nitrite: Negative   Leuk Esterase: Negative / RBC: x / WBC x   Sq Epi: x / Non Sq Epi: x / Bacteria: x          EK22  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 Patient is short   of breath with cough and fever.    Heart normal for projection.    Lungs are clear.    Chest is similar to CAT scan of 2016.    IMPRESSION: No acute finding or change.

## 2022-06-15 NOTE — PROGRESS NOTE ADULT - ASSESSMENT
1. Bradycardia with 2:1 heart block in the settings of recent COVID infection on BBL   - BBL stopped, observing off it - if still present will consider PPM   - TTE reveiwed   - son-in-law concerned about SELMA but pt is compensated now - may needs sleep study as outpt    2. CoVID infection - stable, >5 days  - symptomatic management    3. VTE proph - LMWH    4. HTN - amlodipine, hydralazine    5. HLD - statin    Time spent 54 min  Discussed with RN, EP team and son-in-law [phone]

## 2022-06-15 NOTE — CONSULT NOTE ADULT - ASSESSMENT
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 Pt was seen at urgent care tested COVID + and came to ED for evaluation. Here his HR drops in the 30's intermittent block per ED physician. At this time I do not think Mr. Carreon needs a PPM, he responses in bed with exercise (lifting a bag of saline) with an increase in HR from 46 to 56 1:1 conduction, continue to monitor him. I believe his symptoms are self limiting and most likely related to the unusual dose of Coreg     -Continue CCU monitoring  -Continue to hold AV stella blockers   Thank you for this consultation

## 2022-06-16 LAB
CRP SERPL-MCNC: 11 MG/L — HIGH
D DIMER BLD IA.RAPID-MCNC: 204 NG/ML DDU — SIGNIFICANT CHANGE UP
FERRITIN SERPL-MCNC: 395 NG/ML — SIGNIFICANT CHANGE UP (ref 30–400)

## 2022-06-16 PROCEDURE — 99232 SBSQ HOSP IP/OBS MODERATE 35: CPT

## 2022-06-16 PROCEDURE — 99221 1ST HOSP IP/OBS SF/LOW 40: CPT

## 2022-06-16 RX ADMIN — ENOXAPARIN SODIUM 40 MILLIGRAM(S): 100 INJECTION SUBCUTANEOUS at 21:16

## 2022-06-16 RX ADMIN — Medication 10 MILLIGRAM(S): at 00:26

## 2022-06-16 RX ADMIN — Medication 100 MILLIGRAM(S): at 21:14

## 2022-06-16 RX ADMIN — ATORVASTATIN CALCIUM 20 MILLIGRAM(S): 80 TABLET, FILM COATED ORAL at 21:13

## 2022-06-16 RX ADMIN — AMLODIPINE BESYLATE 10 MILLIGRAM(S): 2.5 TABLET ORAL at 09:12

## 2022-06-16 RX ADMIN — Medication 5 MILLIGRAM(S): at 09:11

## 2022-06-16 RX ADMIN — Medication 100 MILLIGRAM(S): at 09:11

## 2022-06-16 NOTE — PROGRESS NOTE ADULT - ASSESSMENT
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 Pt was seen at urgent care tested COVID + and came to ED for evaluation. Here his HR drops in the 30's intermittent block per ED physician.He has baseline conduction Dz with bifascicular block and is still having heart block despite no Coreg since MOnday   -Continue CCU monitoring  -Continue to hold AV stella blockers   BP high despite AMlodipine 10mg would add enalpril 10 mg daily   if continues to have heart block tomorrow may need to consider PPM prior to DC but we will hold off for now   D/W DR Moncada

## 2022-06-16 NOTE — PROGRESS NOTE ADULT - ASSESSMENT
1. Bradycardia with 2:1 heart block in the settings of recent COVID infection on BBL      2. CoVID infection - stable, >5 days  - symptomatic management    3. VTE proph - LMWH    4. HTN - amlodipine, hydralazine    5. HLD - statin

## 2022-06-16 NOTE — PHYSICAL THERAPY INITIAL EVALUATION ADULT - MODALITIES TREATMENT COMMENTS
pt left seated in chair post Eval; chair alarm donned; all above lines/monitors in place; callbell in reach; pt instructed not to get up alone; call nursing for assist; anabelle well; pt denied pain; isolation maintained; RN Elo made aware pt OOB

## 2022-06-16 NOTE — PROGRESS NOTE ADULT - SUBJECTIVE AND OBJECTIVE BOX
Patient is a 87y old  Male who presents with a chief complaint of cough and fatigue (16 Jun 2022 11:36)    HPI: 86yo male with PMHx HTN, HLD, presents to ED c/o fatigue and cough x 1 week. Pt states he went to urgent care and tested positive for COVID on 6/13. Reports that he has been experiencing fatigue and mild dyspnea for almost over 1 month, but it hasnt prevented him from performing ADLs. Takes Coreg 37.5mg bid at home, last taken Monday morning 6/13. Denies fever, chills, HA, dizziness, syncope, chest pain, palpitations, nausea, abd pain, dec appetite, recent tick exposure.     Events last 24 hours: Pt laying in bed comfortably. Denies chest pain, dizziness, SOB. Intermittent heart    PAST MEDICAL & SURGICAL HISTORY:  HTN (hypertension)    Medications:  amLODIPine   Tablet 10 milliGRAM(s) Oral daily  hydrALAZINE Injectable 10 milliGRAM(s) IV Push every 6 hours PRN  torsemide 5 milliGRAM(s) Oral daily  benzonatate 100 milliGRAM(s) Oral every 8 hours  guaiFENesin Oral Liquid (Sugar-Free) 100 milliGRAM(s) Oral every 6 hours PRN  acetaminophen     Tablet .. 650 milliGRAM(s) Oral every 6 hours PRN  melatonin 3 milliGRAM(s) Oral at bedtime PRN  ondansetron Injectable 4 milliGRAM(s) IV Push every 8 hours PRN  enoxaparin Injectable 40 milliGRAM(s) SubCutaneous every 24 hours  aluminum hydroxide/magnesium hydroxide/simethicone Suspension 30 milliLiter(s) Oral every 4 hours PRN  atorvastatin 20 milliGRAM(s) Oral at bedtime    ICU Vital Signs Last 24 Hrs  T(C): 36.2 (16 Jun 2022 06:20), Max: 36.7 (15 Volodymyr 2022 14:26)  T(F): 97.1 (16 Jun 2022 06:20), Max: 98.1 (15 Volodymyr 2022 14:26)  HR: 47 (16 Jun 2022 11:00) (37 - 71)  BP: 152/69 (16 Jun 2022 11:00) (116/56 - 178/57)  BP(mean): 90 (16 Jun 2022 11:00) (65 - 110)  RR: 15 (16 Jun 2022 11:00) (10 - 23)  SpO2: 98% (16 Jun 2022 11:00) (92% - 100%)    I&O's Detail    15 Volodymyr 2022 07:01  -  16 Jun 2022 07:00  --------------------------------------------------------  IN:    Oral Fluid: 1000 mL  Total IN: 1000 mL    OUT:    Voided (mL): 1930 mL  Total OUT: 1930 mL    Total NET: -930 mL    LABS:                        14.7   7.73  )-----------( 204      ( 15 Volodymyr 2022 05:07 )             41.3     06-15    141  |  110<H>  |  15  ----------------------------<  118<H>  3.6   |  24  |  1.02    Ca    8.9      15 Volodymyr 2022 05:07    CULTURES:  Culture Results:   <10,000 CFU/mL Normal Urogenital Joann (06-13 @ 13:11)    Physical Examination:  General: No acute distress.    HEENT: Pupils equal, reactive to light.  Symmetric.  PULM: Clear to auscultation bilaterally, no wheezing   NECK: Supple, no lymphadenopathy, trachea midline  CVS: Irregular, no murmurs  ABD: Soft, nondistended, nontender, normoactive bowel sounds, no masses  EXT: No edema, nontender  SKIN: Warm and well perfused, no rashes noted.  NEURO: Alert, oriented, interactive, no focal deficits.

## 2022-06-16 NOTE — PROGRESS NOTE ADULT - SUBJECTIVE AND OBJECTIVE BOX
88 y/o male with a PMHx of HTN presents to the ED c/o cough and fatigue x1 week. Pt was seen at urgent care today, tested COVID + and came to ED for evaluation. In ED patient noted to have episodes bradycardia with HR 30s.  Patient denies any CP, palpitations, SOB, dizziness, lightheadedness, near syncope or syncope.  Patient is currently on Coreg 37.5mg BID and took last dose this morning.  EP asked to evaluate for bradycardia.    6/15/22:  pt seen sitting up in bed in NAD, VSS, wearing O2 via NC, sat 97%.  Last dose Coreg on Monday AM.  Pt endorses that he may have been feeling dizzy and weak with with LOVELL for over a month, prior to getting covid.  TELE: sinus with intermittent yet more frequent  2:1 HB, bradycardic  36-42 bp        EKG:  TELE:    MEDICATIONS  (STANDING):  amLODIPine   Tablet 10 milliGRAM(s) Oral daily  atorvastatin 20 milliGRAM(s) Oral at bedtime  benzonatate 100 milliGRAM(s) Oral every 8 hours  enoxaparin Injectable 40 milliGRAM(s) SubCutaneous every 24 hours  torsemide 5 milliGRAM(s) Oral daily    MEDICATIONS  (PRN):  acetaminophen     Tablet .. 650 milliGRAM(s) Oral every 6 hours PRN Temp greater or equal to 38C (100.4F), Mild Pain (1 - 3)  aluminum hydroxide/magnesium hydroxide/simethicone Suspension 30 milliLiter(s) Oral every 4 hours PRN Dyspepsia  guaiFENesin Oral Liquid (Sugar-Free) 100 milliGRAM(s) Oral every 6 hours PRN Cough  hydrALAZINE Injectable 10 milliGRAM(s) IV Push every 6 hours PRN BP>160  melatonin 3 milliGRAM(s) Oral at bedtime PRN Insomnia  ondansetron Injectable 4 milliGRAM(s) IV Push every 8 hours PRN Nausea and/or Vomiting      Allergies    No Known Allergies    Intolerances        Vital Signs Last 24 Hrs  T(C): 36.2 (16 Jun 2022 06:20), Max: 36.7 (15 Volodymyr 2022 14:26)  T(F): 97.1 (16 Jun 2022 06:20), Max: 98.1 (15 Volodymyr 2022 14:26)  HR: 43 (16 Jun 2022 09:00) (37 - 71)  BP: 176/94 (16 Jun 2022 09:00) (116/56 - 178/57)  BP(mean): 110 (16 Jun 2022 09:00) (65 - 110)  RR: 16 (16 Jun 2022 09:00) (10 - 23)  SpO2: 99% (16 Jun 2022 09:00) (92% - 100%)    PHYSICAL EXAMINATION:    GENERAL APPEARANCE:  Pt. is not currently dyspneic, in no distress. Pt. is alert, oriented, and pleasant.  HEENT:  Pupils are normal and react normally. No icterus. Mucous membranes well colored.  NECK:  Supple. No lymphadenopathy. Jugular venous pressure not elevated. Carotids equal.   HEART:   The cardiac impulse has a normal quality. There are no murmurs, rubs or gallops noted  CHEST:  Chest is clear to auscultation. Normal respiratory effort.  ABDOMEN:  Soft and nontender.   EXTREMITIES:  There is no edema.   SKIN:  No rash or significant lesions are noted.    LABS:                        14.7   7.73  )-----------( 204      ( 15 Volodymyr 2022 05:07 )             41.3     06-15    141  |  110<H>  |  15  ----------------------------<  118<H>  3.6   |  24  |  1.02    Ca    8.9      15 Volodymyr 2022 05:07                RADIOLOGY & ADDITIONAL TESTS:    ASSESSMENT & PLAN: 86 y/o male with a PMHx of HTN presents to the ED c/o cough and fatigue x1 week. Pt was seen at urgent care today, tested COVID + and came to ED for evaluation. In ED patient noted to have episodes bradycardia with HR 30s.  Patient denies any CP, palpitations, SOB, dizziness, lightheadedness, near syncope or syncope.  Patient is currently on Coreg 37.5mg BID and took last dose this morning.  EP asked to evaluate for bradycardia.    6/15/22:  pt seen sitting up in bed in NAD, VSS, wearing O2 via NC, sat 97%.  Last dose Coreg on Monday AM.  Pt endorses that he may have been feeling dizzy and weak with with LOVELL for over a month, prior to getting covid.  TELE: sinus with intermittent yet more frequent  2:1 HB, bradycardic  36-42 bp      TELE:  SR with frequent events of 2:1 HB.    MEDICATIONS  (STANDING):  amLODIPine   Tablet 10 milliGRAM(s) Oral daily  atorvastatin 20 milliGRAM(s) Oral at bedtime  benzonatate 100 milliGRAM(s) Oral every 8 hours  enoxaparin Injectable 40 milliGRAM(s) SubCutaneous every 24 hours  torsemide 5 milliGRAM(s) Oral daily    MEDICATIONS  (PRN):  acetaminophen     Tablet .. 650 milliGRAM(s) Oral every 6 hours PRN Temp greater or equal to 38C (100.4F), Mild Pain (1 - 3)  aluminum hydroxide/magnesium hydroxide/simethicone Suspension 30 milliLiter(s) Oral every 4 hours PRN Dyspepsia  guaiFENesin Oral Liquid (Sugar-Free) 100 milliGRAM(s) Oral every 6 hours PRN Cough  hydrALAZINE Injectable 10 milliGRAM(s) IV Push every 6 hours PRN BP>160  melatonin 3 milliGRAM(s) Oral at bedtime PRN Insomnia  ondansetron Injectable 4 milliGRAM(s) IV Push every 8 hours PRN Nausea and/or Vomiting      Allergies    No Known Allergies    Intolerances        Vital Signs Last 24 Hrs  T(C): 36.2 (16 Jun 2022 06:20), Max: 36.7 (15 Volodymyr 2022 14:26)  T(F): 97.1 (16 Jun 2022 06:20), Max: 98.1 (15 Volodymyr 2022 14:26)  HR: 43 (16 Jun 2022 09:00) (37 - 71)  BP: 176/94 (16 Jun 2022 09:00) (116/56 - 178/57)  BP(mean): 110 (16 Jun 2022 09:00) (65 - 110)  RR: 16 (16 Jun 2022 09:00) (10 - 23)  SpO2: 99% (16 Jun 2022 09:00) (92% - 100%)    PHYSICAL EXAMINATION:    GENERAL APPEARANCE:  Pt. is not currently dyspneic, in no distress. Pt. is alert, oriented, and pleasant.  HEENT:  Pupils are normal and react normally. No icterus. Mucous membranes well colored.  NECK:  Supple. No lymphadenopathy. Jugular venous pressure not elevated. Carotids equal.   HEART:   The cardiac impulse has a normal quality. There are no murmurs, rubs or gallops noted  CHEST:  Chest is clear to auscultation. Normal respiratory effort.  ABDOMEN:  Soft and nontender.   EXTREMITIES:  There is no edema.   SKIN:  No rash or significant lesions are noted.    LABS:                        14.7   7.73  )-----------( 204      ( 15 Volodymyr 2022 05:07 )             41.3     06-15    141  |  110<H>  |  15  ----------------------------<  118<H>  3.6   |  24  |  1.02    Ca    8.9      15 Volodymyr 2022 05:07                RADIOLOGY & ADDITIONAL TESTS:    ASSESSMENT & PLAN: 88 y/o male with a PMHx of HTN presents to the ED c/o cough and fatigue x1 week. Pt was seen at urgent care today, tested COVID + and came to ED for evaluation. In ED patient noted to have episodes bradycardia with HR 30s.  Patient denies any CP, palpitations, SOB, dizziness, lightheadedness, near syncope or syncope.  Patient is currently on Coreg 37.5mg BID and took last dose this morning.  EP asked to evaluate for bradycardia.    6/15/22:  pt seen sitting up in bed in NAD, VSS, wearing O2 via NC, sat 97%.  Last dose Coreg on Monday AM.  Pt endorses that he may have been feeling dizzy and weak with with LOVELL for over a month, prior to getting covid.  TELE: sinus with intermittent yet more frequent  2:1 HB, bradycardic  36-42 bp      TELE:  SR with frequent events of 2:1 HB.    MEDICATIONS  (STANDING):  amLODIPine   Tablet 10 milliGRAM(s) Oral daily  atorvastatin 20 milliGRAM(s) Oral at bedtime  benzonatate 100 milliGRAM(s) Oral every 8 hours  enoxaparin Injectable 40 milliGRAM(s) SubCutaneous every 24 hours  torsemide 5 milliGRAM(s) Oral daily    MEDICATIONS  (PRN):  acetaminophen     Tablet .. 650 milliGRAM(s) Oral every 6 hours PRN Temp greater or equal to 38C (100.4F), Mild Pain (1 - 3)  aluminum hydroxide/magnesium hydroxide/simethicone Suspension 30 milliLiter(s) Oral every 4 hours PRN Dyspepsia  guaiFENesin Oral Liquid (Sugar-Free) 100 milliGRAM(s) Oral every 6 hours PRN Cough  hydrALAZINE Injectable 10 milliGRAM(s) IV Push every 6 hours PRN BP>160  melatonin 3 milliGRAM(s) Oral at bedtime PRN Insomnia  ondansetron Injectable 4 milliGRAM(s) IV Push every 8 hours PRN Nausea and/or Vomiting      Allergies    No Known Allergies    Intolerances        Vital Signs Last 24 Hrs  T(C): 36.2 (16 Jun 2022 06:20), Max: 36.7 (15 Volodymyr 2022 14:26)  T(F): 97.1 (16 Jun 2022 06:20), Max: 98.1 (15 Volodymyr 2022 14:26)  HR: 43 (16 Jun 2022 09:00) (37 - 71)  BP: 176/94 (16 Jun 2022 09:00) (116/56 - 178/57)  BP(mean): 110 (16 Jun 2022 09:00) (65 - 110)  RR: 16 (16 Jun 2022 09:00) (10 - 23)  SpO2: 99% (16 Jun 2022 09:00) (92% - 100%)    PHYSICAL EXAMINATION:    GENERAL APPEARANCE:  Pt. is not currently dyspneic, in no distress. Pt. is alert, oriented, and pleasant.  HEENT:  Pupils are normal and react normally. No icterus. Mucous membranes well colored.  NECK:  Supple. No lymphadenopathy. Jugular venous pressure not elevated. Carotids equal.   HEART:   The cardiac impulse has a normal quality. There are no murmurs, rubs or gallops noted  CHEST:  Chest is clear to auscultation. Normal respiratory effort.  ABDOMEN:  Soft and nontender.   EXTREMITIES:  There is no edema.   SKIN:  No rash or significant lesions are noted.    LABS:                        14.7   7.73  )-----------( 204      ( 15 Volodymyr 2022 05:07 )             41.3     06-15    141  |  110<H>  |  15  ----------------------------<  118<H>  3.6   |  24  |  1.02    Ca    8.9      15 Volodymyr 2022 05:07                RADIOLOGY & ADDITIONAL TESTS:

## 2022-06-16 NOTE — PHYSICAL THERAPY INITIAL EVALUATION ADULT - GENERAL OBSERVATIONS, REHAB EVAL
HM; BP cuff; pulse oxym; pt rec'd in bed supine; HR 77; /80; O2 Sat 98%; RR 11; pt denied pain; Note: HR fluctuated down to 40's intermittently during Eval; LUISITO jacques

## 2022-06-16 NOTE — PROGRESS NOTE ADULT - ASSESSMENT
ASSESSMENT:  86yo male with PMHx HTN, HLD, presents to ED c/o fatigue and cough x 1 week, admitted for bradycardia with intermittent heart block likely 2/2 beta blocker use    1. Bradycardia with intermittent 2:1 heart block in setting of beta blocker use  2. uncontrolled HTN  3. COVID 19 infection    Neuro: No active issues  CV: dc coreg. avoid AV stella blockers. EP and cardio following, last dose coreg taken Monday AM. TTE showing EF 60%, normal LV function. possible PPM if heart block continues. Norvasc, hydralazine prn for BP control. cont statin and torsemide 5mg po qd. Trop negative x 2, electrolytes wnl, TSH wnl.   Pulm: Pt O2 sat >95% on room air. Continue to monitor O2. Isolation and contact precautions.   GI: DASH/TLC diet.  ID: covid positive, not hypoxic, D-dimer 195. Supportive care. CXR with no infiltrates or evidence of fluid overload.   Heme: DVT ppx with lovenox sq, PT eval     Discussed with Dr. Foote    *THIS NOTE IS FOR ACADEMIC PURPOSES ONLY*

## 2022-06-16 NOTE — PROGRESS NOTE ADULT - SUBJECTIVE AND OBJECTIVE BOX
CC: dizzy weak (14 Jun 2022 12:37)    HPI: 86 y/o male with a PMHx of HTN presents to the ED c/o cough and fatigue x1 week. Pt was seen at urgent care today, tested COVID + and came to ED for evaluation. Here his HR drops in the 30's intermittent block per ED physician, he is on Coreg at home. Adm for further evaluation of arrhythmia.   (13 Jun 2022 16:06)    No change in his status, inpatient PPM       Vital Signs Last 24 Hrs  T(C): 36.2 (16 Jun 2022 06:20), Max: 36.7 (15 Volodymyr 2022 14:26)  T(F): 97.1 (16 Jun 2022 06:20), Max: 98.1 (15 Volodymyr 2022 14:26)  HR: 47 (16 Jun 2022 11:00) (37 - 71)  BP: 152/69 (16 Jun 2022 11:00) (116/56 - 178/57)  BP(mean): 90 (16 Jun 2022 11:00) (65 - 110)  RR: 15 (16 Jun 2022 11:00) (10 - 23)  SpO2: 98% (16 Jun 2022 11:00) (92% - 100%)        PHYSICAL EXAM:    General: obese male in no acute distress  Eyes: PERRLA, EOMI; conjunctiva and sclera clear  Head: Normocephalic; atraumatic  ENMT: No nasal discharge; airway clear  Neck: Supple; non tender; no masses  Respiratory: No wheezes, rales or rhonchi  Cardiovascular: S1, S2 reg, bradycardic  Gastrointestinal: Soft non-tender non-distended; Normal bowel sounds  Genitourinary: No costovertebral angle tenderness  Extremities: No clubbing, cyanosis or edema  Vascular: Peripheral pulses palpable 2+ bilaterally  Neurological: Alert and oriented x4  Skin: Warm and dry.   Musculoskeletal: Normal tone  Psychiatric: Cooperative, forgetful

## 2022-06-16 NOTE — PROGRESS NOTE ADULT - SUBJECTIVE AND OBJECTIVE BOX
Patient is a 87y old  Male who presents with a chief complaint of dizzy weak (15 Volodymyr 2022 17:05)    6/16- denies SXs at rest     MEDICATIONS  (STANDING):  amLODIPine   Tablet 10 milliGRAM(s) Oral daily  atorvastatin 20 milliGRAM(s) Oral at bedtime  benzonatate 100 milliGRAM(s) Oral every 8 hours  enoxaparin Injectable 40 milliGRAM(s) SubCutaneous every 24 hours  torsemide 5 milliGRAM(s) Oral daily    MEDICATIONS  (PRN):  acetaminophen     Tablet .. 650 milliGRAM(s) Oral every 6 hours PRN Temp greater or equal to 38C (100.4F), Mild Pain (1 - 3)  aluminum hydroxide/magnesium hydroxide/simethicone Suspension 30 milliLiter(s) Oral every 4 hours PRN Dyspepsia  guaiFENesin Oral Liquid (Sugar-Free) 100 milliGRAM(s) Oral every 6 hours PRN Cough  hydrALAZINE Injectable 10 milliGRAM(s) IV Push every 6 hours PRN BP>160  melatonin 3 milliGRAM(s) Oral at bedtime PRN Insomnia  ondansetron Injectable 4 milliGRAM(s) IV Push every 8 hours PRN Nausea and/or Vomiting            Vital Signs Last 24 Hrs  T(C): 36.2 (16 Jun 2022 06:20), Max: 36.7 (15 Volodymyr 2022 14:26)  T(F): 97.1 (16 Jun 2022 06:20), Max: 98.1 (15 Volodymyr 2022 14:26)  HR: 67 (16 Jun 2022 07:00) (37 - 71)  BP: 145/62 (16 Jun 2022 07:00) (116/56 - 178/57)  BP(mean): 84 (16 Jun 2022 07:00) (65 - 101)  RR: 18 (16 Jun 2022 07:00) (10 - 23)  SpO2: 93% (16 Jun 2022 07:00) (92% - 100%)            INTERPRETATION OF TELEMETRY: still with MOBITZ II HB     ECG:        LABS:                        14.7   7.73  )-----------( 204      ( 15 Volodymyr 2022 05:07 )             41.3     06-15    141  |  110<H>  |  15  ----------------------------<  118<H>  3.6   |  24  |  1.02    Ca    8.9      15 Volodymyr 2022 05:07              I&O's Summary    15 Volodymyr 2022 07:01  -  16 Jun 2022 07:00  --------------------------------------------------------  IN: 1000 mL / OUT: 1930 mL / NET: -930 mL      BNP  RADIOLOGY & ADDITIONAL STUDIES:

## 2022-06-16 NOTE — PROGRESS NOTE ADULT - ASSESSMENT
87 year old male with history of HTN, normal stress and echo approximately two years ago per patient report,  found to have bradycardia in setting of COVID infection, on high dose beta blocker.  He denies any current light-headeness or feeling dizzy    A/P:  Bradycardia, intermittent 2:1 Heart block continues  Continue tele monitoring   Hemodynamically stable, asymptomatic at present- pt states he has had LOVELL for at least a month when trying to do things around the house  Last dose coreg was 6/13 in am  Hold any further AV stella blockers  If patient has persistent high degree AV block off of AV stella blockers, pacemaker may be indicated  He is in agreement for PPM if it is warranted.  2D echo reveals normal LV function, EF 60%  Pt is Covid +, continued care per medicine  PT consult ordered and OOB with assistance 87 year old male with history of HTN, normal stress and echo approximately two years ago per patient report,  found to have bradycardia in setting of COVID infection, on high dose beta blocker.  Last dose of Coreg 37.5 mg was Jan 13th in the am  He denies any current light-headedness or feeling dizzy  2D echo reveals normal LV function, EF 60%    A/P:  Bradycardia, intermittent 2:1 Heart block continues  Continue tele monitoring   Hemodynamically stable, asymptomatic at present- pt states he has had LOVELL for at least a month when trying to do things around the house  Last dose coreg was 6/13 in am  Hold any further AV stella blockers  If patient has persistent high degree AV block off of AV stella blockers, pacemaker may be indicated in the future  He is in agreement for PPM if it is warranted.    Pt is Covid +, continued care per medicine  PT consult ordered and OOB with assistance  From EP point of view if pt remains asymptomatic can be discharged in am   Recommend MCOT to be delivered to this pt home from [facility]- arrangements have been made  Plan had been discussed with Dr. Vicente and will discuss with Dr. Marie

## 2022-06-17 ENCOUNTER — TRANSCRIPTION ENCOUNTER (OUTPATIENT)
Age: 87
End: 2022-06-17

## 2022-06-17 VITALS
OXYGEN SATURATION: 95 % | HEART RATE: 82 BPM | SYSTOLIC BLOOD PRESSURE: 156 MMHG | RESPIRATION RATE: 15 BRPM | DIASTOLIC BLOOD PRESSURE: 77 MMHG

## 2022-06-17 PROCEDURE — 99239 HOSP IP/OBS DSCHRG MGMT >30: CPT

## 2022-06-17 RX ORDER — CARVEDILOL PHOSPHATE 80 MG/1
1.5 CAPSULE, EXTENDED RELEASE ORAL
Qty: 0 | Refills: 0 | DISCHARGE

## 2022-06-17 RX ORDER — AMLODIPINE BESYLATE 2.5 MG/1
1 TABLET ORAL
Qty: 30 | Refills: 0
Start: 2022-06-17 | End: 2022-07-16

## 2022-06-17 RX ORDER — ACETAMINOPHEN 500 MG
2 TABLET ORAL
Qty: 0 | Refills: 0 | DISCHARGE
Start: 2022-06-17

## 2022-06-17 RX ADMIN — AMLODIPINE BESYLATE 10 MILLIGRAM(S): 2.5 TABLET ORAL at 11:35

## 2022-06-17 RX ADMIN — Medication 5 MILLIGRAM(S): at 11:34

## 2022-06-17 NOTE — PROGRESS NOTE ADULT - REASON FOR ADMISSION
dizzy weak
cough fatigue

## 2022-06-17 NOTE — PROGRESS NOTE ADULT - ASSESSMENT
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 Pt was seen at urgent care tested COVID + and came to ED for evaluation. Here his HR drops in the 30's intermittent block per ED physician.He has baseline conduction Dz with bifascicular block and is still having heart block despite no Coreg since MOnday   -Continue CCU monitoring  -Continue to hold AV stella blockers   BP high despite AMlodipine 10mg would add enalpril 10 mg daily   as per EP despite continued Mobitz II HB he will be sent home with oupt MCOT   I Have instructed him to call us immediately if he develops lightheadness or Dizziness.

## 2022-06-17 NOTE — DISCHARGE NOTE PROVIDER - NSDCMRMEDTOKEN_GEN_ALL_CORE_FT
acetaminophen 325 mg oral tablet: 2 tab(s) orally every 6 hours, As needed, Temp greater or equal to 38C (100.4F), Mild Pain (1 - 3)  amLODIPine 10 mg oral tablet: 1 tab(s) orally once a day  benzonatate 100 mg oral capsule: 1 cap(s) orally every 8 hours  Co Q-10 100 mg oral capsule: 2 cap(s) orally once a day  cyanocobalamin 2500 mcg oral tablet: 1 tab(s) orally once a day  enalapril 10 mg oral tablet: 1 tab(s) orally once a day   febuxostat 40 mg oral tablet: 1 tab(s) orally once a day  Flax Seed Oil oral capsule: 1 cap(s) orally once a day  guaiFENesin 100 mg/5 mL oral liquid: 5 milliliter(s) orally every 6 hours, As needed, Cough  Multiple Vitamins oral tablet: 1 tab(s) orally once a day  pantoprazole 40 mg oral delayed release tablet: 1 tab(s) orally once a day  Probiotic Formula oral capsule: 1 cap(s) orally once a day  rosuvastatin 5 mg oral tablet: 1 tab(s) orally once a day  Saw Palmetto 450 mg oral capsule: 1 each orally once a day  torsemide 20 mg oral tablet: 0.25 tab(s) orally once a day (5mg)  Vascepa 1 g oral capsule: 2 cap(s) orally 2 times a day  Vitamin D3 50 mcg (2000 intl units) oral tablet: 2 tab(s) orally once a day

## 2022-06-17 NOTE — DISCHARGE NOTE NURSING/CASE MANAGEMENT/SOCIAL WORK - NSDCPEFALRISK_GEN_ALL_CORE
For information on Fall & Injury Prevention, visit: [email or web address] OR  [email or web address] OR  https://www.cdc.gov/steadi/patient.html

## 2022-06-17 NOTE — CHART NOTE - NSCHARTNOTEFT_GEN_A_CORE
RN notified EP team that pt would like to speak to cardiologist Dr. Ardon regarding his condition and for his opinion regarding possible ppm.  I spoke with Dr. Ardon and made him aware.
EP Service:    87 year old male with history of HTN, normal stress and echo approximately two years ago per patient report,  found to have bradycardia in setting of COVID infection, on high dose beta blocker.  Last dose of Coreg 37.5 mg was Jan 13th in the am  He denies any current light-headedness or feeling dizzy  2D echo reveals normal LV function, EF 60%    6/17/22:  pt denies complaints, he says he feels better from COVID infection, no dizziness.  TELE: SR 70- 80 bpm, intermittent 2:1 HB    MEDICATIONS  (STANDING):  amLODIPine   Tablet 10 milliGRAM(s) Oral daily  atorvastatin 20 milliGRAM(s) Oral at bedtime  benzonatate 100 milliGRAM(s) Oral every 8 hours  enoxaparin Injectable 40 milliGRAM(s) SubCutaneous every 24 hours  torsemide 5 milliGRAM(s) Oral daily    MEDICATIONS  (PRN):  acetaminophen     Tablet .. 650 milliGRAM(s) Oral every 6 hours PRN Temp greater or equal to 38C (100.4F), Mild Pain (1 - 3)  aluminum hydroxide/magnesium hydroxide/simethicone Suspension 30 milliLiter(s) Oral every 4 hours PRN Dyspepsia  guaiFENesin Oral Liquid (Sugar-Free) 100 milliGRAM(s) Oral every 6 hours PRN Cough  hydrALAZINE Injectable 10 milliGRAM(s) IV Push every 6 hours PRN BP>160  melatonin 3 milliGRAM(s) Oral at bedtime PRN Insomnia  ondansetron Injectable 4 milliGRAM(s) IV Push every 8 hours PRN Nausea and/or Vomiting      Vital Signs Last 24 Hrs  T(C): 36 (17 Jun 2022 08:00), Max: 36.7 (16 Jun 2022 21:52)  T(F): 96.8 (17 Jun 2022 08:00), Max: 98 (16 Jun 2022 21:52)  HR: 80 (17 Jun 2022 10:00) (43 - 80)  BP: 146/73 (17 Jun 2022 09:00) (126/37 - 165/76)  BP(mean): 88 (17 Jun 2022 09:00) (61 - 102)  RR: 16 (17 Jun 2022 10:00) (3 - 22)  SpO2: 96% (17 Jun 2022 09:00) (90% - 99%)    < from: TTE Echo Complete w/o Contrast w/ Doppler (06.14.22 @ 11:56) >     Impression     Summary     Left ventricle systolic function appears preserved in the presence of a   cardiac arrhythmia. Left ventricle size and structure are within normal   limitations.   Estimated left ventricular ejection fraction is 60 %.   Normal appearing left atrium.   Normal appearing right atrium.   Normal appearing right ventricle structure and function.   The aortic valve appears mildly calcified. Valve opening seems to be   normal.   Moderate mitral annular calcification is present.   The mitral valve leaflets appear thickened.   EA reversal of the mitral inflow consistent with reducedcompliance of   the   left ventricle.   Trace mitral regurgitation is present.   The tricuspid valve leaflets appear mildly thickened and/or calcified,   but   open well.   Trace to mild tricuspid valve regurgitation is present.   No evidence of pericardial effusion.          A/P:  Bradycardia, intermittent 2:1 Heart block   Continue tele monitoring   Hemodynamically stable, asymptomatic at present- pt states he has had LOVELL for at least a month when trying to do things around the house  Last dose coreg was 6/13 in am  Hold any further AV stella blockers  If patient has persistent high degree AV block off of AV stella blockers, pacemaker may be indicated in the future    Pt is Covid +, continued care per medicine  PT consult ordered and OOB with assistance  From EP point of view if pt remains asymptomatic can be discharged  MCOT to be delivered to this pt home from [facility]- arrangements have been made.  He will f/u with Dr. Ardon after wearing MCOT and instructed to come to ED if symptoms return or worsen.  Lyme titers are pending, TSH 2.80.  Plan discussed with Dr. Vicente, patient, RN, hospitalist MD.

## 2022-06-17 NOTE — DISCHARGE NOTE PROVIDER - CARE PROVIDERS DIRECT ADDRESSES
[email or web address]

## 2022-06-17 NOTE — DISCHARGE NOTE PROVIDER - ATTENDING DISCHARGE PHYSICAL EXAMINATION:
General: obese male in no acute distress  Eyes: PERRLA, EOMI; conjunctiva and sclera clear  Head: Normocephalic; atraumatic  ENMT: No nasal discharge; airway clear  Neck: Supple; non tender; no masses  Respiratory: No wheezes, rales or rhonchi  Cardiovascular: S1, S2 reg, bradycardic  Gastrointestinal: Soft non-tender non-distended; Normal bowel sounds  Genitourinary: No costovertebral angle tenderness  Extremities: No clubbing, cyanosis or edema  Vascular: Peripheral pulses palpable 2+ bilaterally  Neurological: Alert and oriented x4  Skin: Warm and dry.   Musculoskeletal: Normal tone  Psychiatric: Cooperative, forgetful

## 2022-06-17 NOTE — DISCHARGE NOTE PROVIDER - DISCHARGING ATTENDING PHYSICIAN:
JAVIER Mane
Airway patent, Nasal mucosa clear. Mouth with normal mucosa. Throat has no vesicles, no oropharyngeal exudates and uvula is midline.

## 2022-06-17 NOTE — PROGRESS NOTE ADULT - PROVIDER SPECIALTY LIST ADULT
Electrophysiology
Hospitalist
Electrophysiology
Hospitalist
Hospitalist
Cardiology
Electrophysiology
Cardiology
Critical Care

## 2022-06-17 NOTE — DISCHARGE NOTE NURSING/CASE MANAGEMENT/SOCIAL WORK - PATIENT PORTAL LINK FT
You can access the FollowMyHealth Patient Portal offered by [facility] by registering at the following website: [email or web address]. By joining [employer]’s FollowMyHealth portal, you will also be able to view your health information using other applications (apps) compatible with our system.

## 2022-06-17 NOTE — PROGRESS NOTE ADULT - SUBJECTIVE AND OBJECTIVE BOX
Patient is a 87y old  Male who presents with a chief complaint of cough fatigue (16 Jun 2022 12:17)    6/17- no SXs in bed. Still with [facility] on tele    MEDICATIONS  (STANDING):  amLODIPine   Tablet 10 milliGRAM(s) Oral daily  atorvastatin 20 milliGRAM(s) Oral at bedtime  benzonatate 100 milliGRAM(s) Oral every 8 hours  enoxaparin Injectable 40 milliGRAM(s) SubCutaneous every 24 hours  torsemide 5 milliGRAM(s) Oral daily    MEDICATIONS  (PRN):  acetaminophen     Tablet .. 650 milliGRAM(s) Oral every 6 hours PRN Temp greater or equal to 38C (100.4F), Mild Pain (1 - 3)  aluminum hydroxide/magnesium hydroxide/simethicone Suspension 30 milliLiter(s) Oral every 4 hours PRN Dyspepsia  guaiFENesin Oral Liquid (Sugar-Free) 100 milliGRAM(s) Oral every 6 hours PRN Cough  hydrALAZINE Injectable 10 milliGRAM(s) IV Push every 6 hours PRN BP>160  melatonin 3 milliGRAM(s) Oral at bedtime PRN Insomnia  ondansetron Injectable 4 milliGRAM(s) IV Push every 8 hours PRN Nausea and/or Vomiting            Vital Signs Last 24 Hrs  T(C): 36.4 (17 Jun 2022 05:00), Max: 36.7 (16 Jun 2022 21:52)  T(F): 97.5 (17 Jun 2022 05:00), Max: 98 (16 Jun 2022 21:52)  HR: 64 (17 Jun 2022 07:00) (43 - 78)  BP: 134/73 (17 Jun 2022 07:00) (126/37 - 176/94)  BP(mean): 87 (17 Jun 2022 07:00) (61 - 110)  RR: 11 (17 Jun 2022 07:00) (3 - 22)  SpO2: 98% (17 Jun 2022 07:00) (90% - 99%)            INTERPRETATION OF TELEMETRY:    ECG:        LABS:                  I&O's Summary    16 Jun 2022 07:01  -  17 Jun 2022 07:00  --------------------------------------------------------  IN: 0 mL / OUT: 1350 mL / NET: -1350 mL      BNP  RADIOLOGY & ADDITIONAL STUDIES:

## 2022-06-17 NOTE — DISCHARGE NOTE PROVIDER - NSDCCPCAREPLAN_GEN_ALL_CORE_FT
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 Strickland      SECONDARY DISCHARGE DIAGNOSES  Diagnosis: 2019 novel coronavirus disease (COVID-19)  Assessment and Plan of Treatment: cough suppressants    Diagnosis: HTN (hypertension)  Assessment and Plan of Treatment: take new BP meds as prescribed

## 2022-06-17 NOTE — DISCHARGE NOTE PROVIDER - CARE PROVIDER_API CALL
Nguyễn Strickland)  Cardiovascular Disease; Nuclear Cardiology  [address] [place] [address]  Phone: [phone]  Fax: [phone]  Follow Up Time:     Demond Vicente)  Cardiac Electrophysiology; Cardiovascular Disease  [address] [place] [address]  Phone: [phone]  Fax: [phone]  Follow Up Time:   
- - -

## 2022-06-17 NOTE — DISCHARGE NOTE PROVIDER - HOSPITAL COURSE
88 y/o male with a PMHx of HTN presents to the ED c/o cough and fatigue x1 week. Pt was seen at urgent care today, tested COVID + and came to ED for evaluation. Here his HR drops in the 30's intermittent block per ED physician, he is on Coreg at home. Pt was watched off coreg in the settings of COVID with cough - clinically improved but still periodically bradycardic with AVB type II - plan to DC home with GRACIE Lyme titers collected prior to DC.  Medically stable for DC  BP management optimized with norvasc and enalapril.  [facility] evaluation as outpt  Cough suppressants due to cough with COVID  HLD - resume home meds  Time spent 67 min

## 2022-06-22 DIAGNOSIS — I10 ESSENTIAL (PRIMARY) HYPERTENSION: ICD-10-CM

## 2022-06-22 DIAGNOSIS — I44.1 ATRIOVENTRICULAR BLOCK, SECOND DEGREE: ICD-10-CM

## 2022-06-22 DIAGNOSIS — U07.1 COVID-19: ICD-10-CM

## 2022-06-22 DIAGNOSIS — T44.7X5A ADVERSE EFFECT OF BETA-ADRENORECEPTOR ANTAGONISTS, INITIAL ENCOUNTER: ICD-10-CM

## 2023-10-19 PROBLEM — I10 ESSENTIAL (PRIMARY) HYPERTENSION: Chronic | Status: ACTIVE | Noted: 2022-06-13

## 2023-11-01 ENCOUNTER — APPOINTMENT (OUTPATIENT)
Dept: INTERNAL MEDICINE | Facility: CLINIC | Age: 88
End: 2023-11-01
Payer: MEDICARE

## 2023-11-01 VITALS
OXYGEN SATURATION: 97 % | TEMPERATURE: 98 F | DIASTOLIC BLOOD PRESSURE: 80 MMHG | RESPIRATION RATE: 16 BRPM | SYSTOLIC BLOOD PRESSURE: 120 MMHG | HEART RATE: 82 BPM | HEIGHT: 69 IN | WEIGHT: 263 LBS | BODY MASS INDEX: 38.95 KG/M2

## 2023-11-01 DIAGNOSIS — Z78.9 OTHER SPECIFIED HEALTH STATUS: ICD-10-CM

## 2023-11-01 DIAGNOSIS — G47.33 OBSTRUCTIVE SLEEP APNEA (ADULT) (PEDIATRIC): ICD-10-CM

## 2023-11-01 DIAGNOSIS — R35.1 NOCTURIA: ICD-10-CM

## 2023-11-01 DIAGNOSIS — E78.00 PURE HYPERCHOLESTEROLEMIA, UNSPECIFIED: ICD-10-CM

## 2023-11-01 DIAGNOSIS — K21.9 GASTRO-ESOPHAGEAL REFLUX DISEASE W/OUT ESOPHAGITIS: ICD-10-CM

## 2023-11-01 DIAGNOSIS — Z80.0 FAMILY HISTORY OF MALIGNANT NEOPLASM OF DIGESTIVE ORGANS: ICD-10-CM

## 2023-11-01 DIAGNOSIS — I10 ESSENTIAL (PRIMARY) HYPERTENSION: ICD-10-CM

## 2023-11-01 PROCEDURE — 99204 OFFICE O/P NEW MOD 45 MIN: CPT | Mod: 25

## 2023-11-01 PROCEDURE — 94060 EVALUATION OF WHEEZING: CPT

## 2023-11-01 PROCEDURE — G0447 BEHAVIOR COUNSEL OBESITY 15M: CPT | Mod: 59

## 2023-11-01 RX ORDER — CALCIUM CARBONATE/VITAMIN D3 600 MG-10
TABLET ORAL
Refills: 0 | Status: ACTIVE | COMMUNITY

## 2023-11-01 RX ORDER — ICOSAPENT ETHYL 1000 MG/1
1 CAPSULE ORAL
Refills: 0 | Status: ACTIVE | COMMUNITY

## 2023-11-01 RX ORDER — UBIDECARENONE 100 MG
100 CAPSULE ORAL
Refills: 0 | Status: ACTIVE | COMMUNITY

## 2023-11-01 RX ORDER — ENALAPRIL MALEATE 10 MG/1
10 TABLET ORAL
Refills: 0 | Status: ACTIVE | COMMUNITY

## 2023-11-01 RX ORDER — TORSEMIDE 10 MG/1
10 TABLET ORAL
Refills: 0 | Status: ACTIVE | COMMUNITY

## 2023-11-01 RX ORDER — FEBUXOSTAT 40 MG/1
40 TABLET ORAL
Refills: 0 | Status: ACTIVE | COMMUNITY

## 2023-11-01 RX ORDER — UBIDECARENONE/VIT E ACET 100MG-5
CAPSULE ORAL
Refills: 0 | Status: ACTIVE | COMMUNITY

## 2023-11-01 RX ORDER — ROSUVASTATIN CALCIUM 5 MG/1
5 TABLET, FILM COATED ORAL
Refills: 0 | Status: ACTIVE | COMMUNITY

## 2023-11-01 RX ORDER — AMLODIPINE BESYLATE 10 MG/1
10 TABLET ORAL
Refills: 0 | Status: ACTIVE | COMMUNITY

## 2023-11-01 RX ORDER — PANTOPRAZOLE SODIUM 40 MG/1
40 TABLET, DELAYED RELEASE ORAL
Refills: 0 | Status: ACTIVE | COMMUNITY

## 2023-11-01 RX ORDER — MULTIVIT WITH MIN/MFOLATE/K2 340-15/3 G
2500-1700 POWDER (GRAM) ORAL
Refills: 0 | Status: ACTIVE | COMMUNITY

## 2023-11-01 RX ORDER — BACILLUS COAGULANS/INULIN 1B-250 MG
CAPSULE ORAL
Refills: 0 | Status: ACTIVE | COMMUNITY

## 2023-11-15 ENCOUNTER — OUTPATIENT (OUTPATIENT)
Dept: OUTPATIENT SERVICES | Facility: HOSPITAL | Age: 88
LOS: 1 days | End: 2023-11-15
Payer: MEDICARE

## 2023-11-15 DIAGNOSIS — G47.33 OBSTRUCTIVE SLEEP APNEA (ADULT) (PEDIATRIC): ICD-10-CM

## 2023-11-15 PROCEDURE — 95806 SLEEP STUDY UNATT&RESP EFFT: CPT
